# Patient Record
Sex: FEMALE | Race: WHITE | Employment: OTHER | URBAN - METROPOLITAN AREA
[De-identification: names, ages, dates, MRNs, and addresses within clinical notes are randomized per-mention and may not be internally consistent; named-entity substitution may affect disease eponyms.]

---

## 2017-09-08 ENCOUNTER — ANESTHESIA EVENT (OUTPATIENT)
Dept: SURGERY | Facility: CLINIC | Age: 37
End: 2017-09-08
Payer: MEDICARE

## 2017-09-10 ASSESSMENT — ENCOUNTER SYMPTOMS: SEIZURES: 1

## 2017-09-10 NOTE — ANESTHESIA PREPROCEDURE EVALUATION
HPI:  Jenn Briggs is a 37 year old female with a primary diagnosis of dental caries who presents for dental repair.    Otherwise, she  has a past medical history of Constipation; Dysphagia; Hyperlipemia; Mental retardation; Obsessive compulsive disorder; Restless leg syndrome; and Seizure (H).  she  has a past surgical history that includes Exam under anesthesia pelvic (3/28/2013); Exam under anesthesia, restorations, extraction(s) dental complex, combined (3/28/2013); and Exam under anesthesia, restorations, extraction(s) dental, combined (N/A, 10/29/2015).    Anesthesia Evaluation     . Pt has had prior anesthetic.     No history of anesthetic complications          ROS/MED HX    ENT/Pulmonary:  - neg pulmonary ROS     Neurologic:     (+)seizures (well controlled) other neuro Severe mental retardation    Cardiovascular:     (+) Dyslipidemia, ----. : . . . :. .       METS/Exercise Tolerance:     Hematologic:  - neg hematologic  ROS       Musculoskeletal:  - neg musculoskeletal ROS       GI/Hepatic:  - neg GI/hepatic ROS       Renal/Genitourinary:  - ROS Renal section negative       Endo:  - neg endo ROS       Psychiatric:     (+) psychiatric history Psychiatric Hx List: Obessive complusive disorder.      Infectious Disease:  - neg infectious disease ROS       Malignancy:         Other:                         Physical Exam  Normal systems: pulmonary and dental    Airway   Mallampati: I  TM distance: >3 FB  Neck ROM: full    Dental     Cardiovascular   Rhythm and rate: regular and normal      Pulmonary                     Lab Results   Component Value Date    WBC 9.3 03/28/2013    HGB 12.7 03/28/2013    HCT 38.2 03/28/2013     03/28/2013    POTASSIUM 4.0 03/28/2013    PTT 30 03/28/2013    INR 1.03 03/28/2013    HCGS Negative 09/11/2017         Preop Vitals  BP Readings from Last 3 Encounters:   09/11/17 110/83   10/29/15 (!) 140/103   03/28/13 111/86    Pulse Readings from Last 3 Encounters:   09/11/17 86  "     Resp Readings from Last 3 Encounters:   09/11/17 16   10/29/15 22   03/28/13 15    SpO2 Readings from Last 3 Encounters:   09/11/17 97%   10/29/15 93%   03/28/13 93%      Temp Readings from Last 1 Encounters:   09/11/17 36.6  C (97.9  F) (Axillary)    Ht Readings from Last 1 Encounters:   09/11/17 1.651 m (5' 5\")      Wt Readings from Last 1 Encounters:   09/11/17 61.1 kg (134 lb 11.2 oz)    Estimated body mass index is 22.42 kg/(m^2) as calculated from the following:    Height as of this encounter: 1.651 m (5' 5\").    Weight as of this encounter: 61.1 kg (134 lb 11.2 oz).     Current Medications  Outpatient Prescriptions Marked as Taking for the 9/11/17 encounter (Hospital Encounter)   Medication Sig     LevETIRAcetam (KEPPRA PO) Take 500 mg by mouth 2 times daily     bisacodyl (DULCOLAX) 10 MG suppository Place 10 mg rectally daily as needed.     calcium carbonate (OS- MG [place]. [place]) 500 MG tablet Take 500 mg by mouth 2 times daily.     cholecalciferol (VITAMIN D) 400 UNIT TABS Take 400 Units by mouth daily.     escitalopram (LEXAPRO) 20 MG tablet Take 20 mg by mouth daily.     ibuprofen (ADVIL,MOTRIN) 200 MG tablet Take 200 mg by mouth every 6 hours as needed.     lactulose (CHRONULAC) 10 GM/15ML solution Take 20 g by mouth. 15cc daily     loratadine (CLARITIN) 10 MG tablet Take 10 mg by mouth daily.     norgestrel-ethinyl estradiol (LO/OVRAL) 0.3-30 MG-MCG per tablet Take 1 tablet by mouth.     polyethylene glycol (MIRALAX/GLYCOLAX) powder Take 17 g by mouth 2 times daily.     Saline (SODIUM CHLORIDE NA) Spray  in nostril as needed.         LDA         Anesthesia Plan      History & Physical Review  History and physical reviewed and following examination; no interval change.    ASA Status:  2 .    NPO Status:  > 6 hours    Plan for General and ETT with Inhalation induction. Maintenance will be Balanced.    PONV prophylaxis:  Ondansetron (or other 5HT-3) and Dexamethasone or Solumedrol  Consented " Person: Father  Consented via: Phone Consent    Discussed common and potentially harmful risks for General Anesthesia.   These risks include, but were not limited to: Conversion to secured airway, Sore throat, Airway injury, Dental injury, Aspiration, Respiratory issues (Bronchospasm, Laryngospasm, Desaturation), Hemodynamic issues (Arrhythmia, Hypotension, Ischemia), Potential long term consequences of respiratory and hemodynamic issues, PONV, Emergence delirium, Potential overnight admission  Risks of invasive procedures were not discussed: N/A    All questions were answered.      Postoperative Care  Postoperative pain management:  Multi-modal analgesia.      Consents  Anesthetic plan, risks, benefits and alternatives discussed with:  Parent (Mother and/or Father).  Use of blood products discussed: No .   .        Daniel Joseph, 9/11/2017, 11:09 AM

## 2017-09-11 ENCOUNTER — HOSPITAL ENCOUNTER (OUTPATIENT)
Facility: CLINIC | Age: 37
Discharge: HOME | End: 2017-09-11
Attending: DENTIST | Admitting: DENTIST
Payer: MEDICARE

## 2017-09-11 ENCOUNTER — ANESTHESIA (OUTPATIENT)
Dept: SURGERY | Facility: CLINIC | Age: 37
End: 2017-09-11
Payer: MEDICARE

## 2017-09-11 ENCOUNTER — SURGERY (OUTPATIENT)
Age: 37
End: 2017-09-11

## 2017-09-11 VITALS
HEART RATE: 86 BPM | SYSTOLIC BLOOD PRESSURE: 148 MMHG | DIASTOLIC BLOOD PRESSURE: 91 MMHG | OXYGEN SATURATION: 94 % | RESPIRATION RATE: 16 BRPM | WEIGHT: 134.7 LBS | HEIGHT: 65 IN | BODY MASS INDEX: 22.44 KG/M2 | TEMPERATURE: 98.1 F

## 2017-09-11 LAB — HCG SERPL QL: NEGATIVE

## 2017-09-11 PROCEDURE — 25000132 ZZH RX MED GY IP 250 OP 250 PS 637: Mod: GY | Performed by: DENTIST

## 2017-09-11 PROCEDURE — A9270 NON-COVERED ITEM OR SERVICE: HCPCS | Mod: GY | Performed by: DENTIST

## 2017-09-11 PROCEDURE — 84703 CHORIONIC GONADOTROPIN ASSAY: CPT | Performed by: ANESTHESIOLOGY

## 2017-09-11 PROCEDURE — 25000125 ZZHC RX 250: Performed by: STUDENT IN AN ORGANIZED HEALTH CARE EDUCATION/TRAINING PROGRAM

## 2017-09-11 PROCEDURE — 71000014 ZZH RECOVERY PHASE 1 LEVEL 2 FIRST HR: Performed by: DENTIST

## 2017-09-11 PROCEDURE — 37000008 ZZH ANESTHESIA TECHNICAL FEE, 1ST 30 MIN: Performed by: DENTIST

## 2017-09-11 PROCEDURE — 71000027 ZZH RECOVERY PHASE 2 EACH 15 MINS: Performed by: DENTIST

## 2017-09-11 PROCEDURE — 40000170 ZZH STATISTIC PRE-PROCEDURE ASSESSMENT II: Performed by: DENTIST

## 2017-09-11 PROCEDURE — 25000125 ZZHC RX 250: Performed by: DENTIST

## 2017-09-11 PROCEDURE — 36000053 ZZH SURGERY LEVEL 2 EA 15 ADDTL MIN - UMMC: Performed by: DENTIST

## 2017-09-11 PROCEDURE — 37000009 ZZH ANESTHESIA TECHNICAL FEE, EACH ADDTL 15 MIN: Performed by: DENTIST

## 2017-09-11 PROCEDURE — 27210794 ZZH OR GENERAL SUPPLY STERILE: Performed by: DENTIST

## 2017-09-11 PROCEDURE — 25000566 ZZH SEVOFLURANE, EA 15 MIN: Performed by: DENTIST

## 2017-09-11 PROCEDURE — 36000051 ZZH SURGERY LEVEL 2 1ST 30 MIN - UMMC: Performed by: DENTIST

## 2017-09-11 PROCEDURE — 25000125 ZZHC RX 250: Performed by: ANESTHESIOLOGY

## 2017-09-11 PROCEDURE — 25000128 H RX IP 250 OP 636: Performed by: STUDENT IN AN ORGANIZED HEALTH CARE EDUCATION/TRAINING PROGRAM

## 2017-09-11 PROCEDURE — 25000132 ZZH RX MED GY IP 250 OP 250 PS 637: Mod: GY | Performed by: ANESTHESIOLOGY

## 2017-09-11 PROCEDURE — 71000015 ZZH RECOVERY PHASE 1 LEVEL 2 EA ADDTL HR: Performed by: DENTIST

## 2017-09-11 PROCEDURE — A9270 NON-COVERED ITEM OR SERVICE: HCPCS | Mod: GY | Performed by: ANESTHESIOLOGY

## 2017-09-11 PROCEDURE — S0077 INJECTION, CLINDAMYCIN PHOSP: HCPCS | Performed by: DENTIST

## 2017-09-11 PROCEDURE — 36415 COLL VENOUS BLD VENIPUNCTURE: CPT | Performed by: ANESTHESIOLOGY

## 2017-09-11 RX ORDER — SODIUM CHLORIDE, SODIUM LACTATE, POTASSIUM CHLORIDE, CALCIUM CHLORIDE 600; 310; 30; 20 MG/100ML; MG/100ML; MG/100ML; MG/100ML
INJECTION, SOLUTION INTRAVENOUS CONTINUOUS PRN
Status: DISCONTINUED | OUTPATIENT
Start: 2017-09-11 | End: 2017-09-11

## 2017-09-11 RX ORDER — HYDRALAZINE HYDROCHLORIDE 20 MG/ML
2.5-5 INJECTION INTRAMUSCULAR; INTRAVENOUS EVERY 10 MIN PRN
Status: DISCONTINUED | OUTPATIENT
Start: 2017-09-11 | End: 2017-09-11 | Stop reason: HOSPADM

## 2017-09-11 RX ORDER — MEPERIDINE HYDROCHLORIDE 25 MG/ML
12.5 INJECTION INTRAMUSCULAR; INTRAVENOUS; SUBCUTANEOUS
Status: DISCONTINUED | OUTPATIENT
Start: 2017-09-11 | End: 2017-09-11 | Stop reason: HOSPADM

## 2017-09-11 RX ORDER — ONDANSETRON 4 MG/1
4 TABLET, ORALLY DISINTEGRATING ORAL EVERY 30 MIN PRN
Status: DISCONTINUED | OUTPATIENT
Start: 2017-09-11 | End: 2017-09-11 | Stop reason: HOSPADM

## 2017-09-11 RX ORDER — FENTANYL CITRATE 50 UG/ML
INJECTION, SOLUTION INTRAMUSCULAR; INTRAVENOUS PRN
Status: DISCONTINUED | OUTPATIENT
Start: 2017-09-11 | End: 2017-09-11

## 2017-09-11 RX ORDER — ONDANSETRON 2 MG/ML
0.07 INJECTION INTRAMUSCULAR; INTRAVENOUS EVERY 30 MIN PRN
Status: DISCONTINUED | OUTPATIENT
Start: 2017-09-11 | End: 2017-09-11 | Stop reason: HOSPADM

## 2017-09-11 RX ORDER — FENTANYL CITRATE 50 UG/ML
0.5 INJECTION, SOLUTION INTRAMUSCULAR; INTRAVENOUS EVERY 10 MIN PRN
Status: DISCONTINUED | OUTPATIENT
Start: 2017-09-11 | End: 2017-09-11 | Stop reason: HOSPADM

## 2017-09-11 RX ORDER — BACITRACIN ZINC 500 [USP'U]/G
OINTMENT TOPICAL PRN
Status: DISCONTINUED | OUTPATIENT
Start: 2017-09-11 | End: 2017-09-11 | Stop reason: HOSPADM

## 2017-09-11 RX ORDER — EPHEDRINE SULFATE 50 MG/ML
INJECTION, SOLUTION INTRAMUSCULAR; INTRAVENOUS; SUBCUTANEOUS PRN
Status: DISCONTINUED | OUTPATIENT
Start: 2017-09-11 | End: 2017-09-11

## 2017-09-11 RX ORDER — CLINDAMYCIN PHOSPHATE 900 MG/50ML
900 INJECTION, SOLUTION INTRAVENOUS SEE ADMIN INSTRUCTIONS
Status: DISCONTINUED | OUTPATIENT
Start: 2017-09-11 | End: 2017-09-11 | Stop reason: HOSPADM

## 2017-09-11 RX ORDER — LABETALOL HYDROCHLORIDE 5 MG/ML
5-10 INJECTION, SOLUTION INTRAVENOUS
Status: DISCONTINUED | OUTPATIENT
Start: 2017-09-11 | End: 2017-09-11 | Stop reason: HOSPADM

## 2017-09-11 RX ORDER — LIDOCAINE HYDROCHLORIDE 20 MG/ML
INJECTION, SOLUTION INFILTRATION; PERINEURAL PRN
Status: DISCONTINUED | OUTPATIENT
Start: 2017-09-11 | End: 2017-09-11

## 2017-09-11 RX ORDER — NALOXONE HYDROCHLORIDE 0.4 MG/ML
.1-.4 INJECTION, SOLUTION INTRAMUSCULAR; INTRAVENOUS; SUBCUTANEOUS
Status: DISCONTINUED | OUTPATIENT
Start: 2017-09-11 | End: 2017-09-11 | Stop reason: HOSPADM

## 2017-09-11 RX ORDER — CHLORHEXIDINE GLUCONATE ORAL RINSE 1.2 MG/ML
SOLUTION DENTAL PRN
Status: DISCONTINUED | OUTPATIENT
Start: 2017-09-11 | End: 2017-09-11 | Stop reason: HOSPADM

## 2017-09-11 RX ORDER — PROPOFOL 10 MG/ML
INJECTION, EMULSION INTRAVENOUS PRN
Status: DISCONTINUED | OUTPATIENT
Start: 2017-09-11 | End: 2017-09-11

## 2017-09-11 RX ORDER — ONDANSETRON 2 MG/ML
4 INJECTION INTRAMUSCULAR; INTRAVENOUS EVERY 30 MIN PRN
Status: DISCONTINUED | OUTPATIENT
Start: 2017-09-11 | End: 2017-09-11 | Stop reason: HOSPADM

## 2017-09-11 RX ORDER — FENTANYL CITRATE 50 UG/ML
25-50 INJECTION, SOLUTION INTRAMUSCULAR; INTRAVENOUS
Status: DISCONTINUED | OUTPATIENT
Start: 2017-09-11 | End: 2017-09-11 | Stop reason: HOSPADM

## 2017-09-11 RX ORDER — SODIUM CHLORIDE, SODIUM LACTATE, POTASSIUM CHLORIDE, CALCIUM CHLORIDE 600; 310; 30; 20 MG/100ML; MG/100ML; MG/100ML; MG/100ML
INJECTION, SOLUTION INTRAVENOUS CONTINUOUS
Status: DISCONTINUED | OUTPATIENT
Start: 2017-09-11 | End: 2017-09-11 | Stop reason: HOSPADM

## 2017-09-11 RX ORDER — ONDANSETRON 2 MG/ML
INJECTION INTRAMUSCULAR; INTRAVENOUS PRN
Status: DISCONTINUED | OUTPATIENT
Start: 2017-09-11 | End: 2017-09-11

## 2017-09-11 RX ORDER — MIDAZOLAM HYDROCHLORIDE 2 MG/ML
20 SYRUP ORAL
Status: COMPLETED | OUTPATIENT
Start: 2017-09-11 | End: 2017-09-11

## 2017-09-11 RX ORDER — ACETAMINOPHEN 500 MG
1000 TABLET ORAL ONCE
Status: COMPLETED | OUTPATIENT
Start: 2017-09-11 | End: 2017-09-11

## 2017-09-11 RX ORDER — DEXAMETHASONE SODIUM PHOSPHATE 4 MG/ML
INJECTION, SOLUTION INTRA-ARTICULAR; INTRALESIONAL; INTRAMUSCULAR; INTRAVENOUS; SOFT TISSUE PRN
Status: DISCONTINUED | OUTPATIENT
Start: 2017-09-11 | End: 2017-09-11

## 2017-09-11 RX ORDER — CLINDAMYCIN PHOSPHATE 900 MG/50ML
900 INJECTION, SOLUTION INTRAVENOUS
Status: COMPLETED | OUTPATIENT
Start: 2017-09-11 | End: 2017-09-11

## 2017-09-11 RX ADMIN — ACETAMINOPHEN 1000 MG: 500 TABLET ORAL at 10:28

## 2017-09-11 RX ADMIN — FENTANYL CITRATE 50 MCG: 50 INJECTION, SOLUTION INTRAMUSCULAR; INTRAVENOUS at 11:43

## 2017-09-11 RX ADMIN — ONDANSETRON 4 MG: 2 INJECTION INTRAMUSCULAR; INTRAVENOUS at 13:28

## 2017-09-11 RX ADMIN — OSELTAMIVIR PHOSPHATE 0.9 G: 75 CAPSULE ORAL at 13:44

## 2017-09-11 RX ADMIN — DEXAMETHASONE SODIUM PHOSPHATE 4 MG: 4 INJECTION, SOLUTION INTRAMUSCULAR; INTRAVENOUS at 12:12

## 2017-09-11 RX ADMIN — PROPOFOL 130 MG: 10 INJECTION, EMULSION INTRAVENOUS at 11:45

## 2017-09-11 RX ADMIN — LIDOCAINE HYDROCHLORIDE 100 MG: 20 INJECTION, SOLUTION INFILTRATION; PERINEURAL at 11:44

## 2017-09-11 RX ADMIN — Medication 10 MG: at 11:53

## 2017-09-11 RX ADMIN — CHLORHEXIDINE GLUCONATE 15 ML: 1.2 RINSE ORAL at 12:46

## 2017-09-11 RX ADMIN — MIDAZOLAM HYDROCHLORIDE 20 MG: 2 SYRUP ORAL at 11:00

## 2017-09-11 RX ADMIN — SODIUM CHLORIDE, POTASSIUM CHLORIDE, SODIUM LACTATE AND CALCIUM CHLORIDE: 600; 310; 30; 20 INJECTION, SOLUTION INTRAVENOUS at 12:05

## 2017-09-11 RX ADMIN — CLINDAMYCIN PHOSPHATE 900 MG: 18 INJECTION, SOLUTION INTRAVENOUS at 12:07

## 2017-09-11 NOTE — DISCHARGE INSTRUCTIONS
[facility]  Same-Day Surgery   Adult Discharge Orders & Instructions     For 24 hours after surgery    1. Get plenty of rest.  A responsible adult must stay with you for at least 24 hours after you leave the hospital.   2. Do not drive or use heavy equipment.  If you have weakness or tingling, don't drive or use heavy equipment until this feeling goes away.  3. Do not drink alcohol.  4. Avoid strenuous or risky activities.  Ask for help when climbing stairs.   5. You may feel lightheaded.  IF so, sit for a few minutes before standing.  Have someone help you get up.   6. If you have nausea (feel sick to your stomach): Drink only clear liquids such as apple juice, ginger ale, broth or 7-Up.  Rest may also help.  Be sure to drink enough fluids.  Move to a regular diet as you feel able.  7. You may have a slight fever. Call the doctor if your fever is over 100 F (37.7 C) (taken under the tongue) or lasts longer than 24 hours.  8. You may have a dry mouth, a sore throat, muscle aches or trouble sleeping.  These should go away after 24 hours.  9. Do not make important or legal decisions.   Call your doctor for any of the followin.  Signs of infection (fever, growing tenderness at the surgery site, a large amount of drainage or bleeding, severe pain, foul-smelling drainage, redness, swelling).    2. It has been over 8 to 10 hours since surgery and you are still not able to urinate (pass water).    3.  Headache for over 24 hours.    4.  Numbness, tingling or weakness the day after surgery (if you had spinal anesthesia).  To contact a doctor, call ________________________________________ or:        [phone] and ask for the resident on call for   ____dental resident____________________ (answered 24 hours a day)      Emergency Department:    [facility]: [phone]       (TTY for hearing impaired: [phone])    [facility]: [phone]       (TTY for hearing  impaired: [phone])

## 2017-09-11 NOTE — ANESTHESIA POSTPROCEDURE EVALUATION
Patient: Jenn Briggs    Procedure(s):  Dental Exam Radiographs, Periodontal Therapy, Floride Varnish, deep cleaning - Wound Class: II-Clean Contaminated    Diagnosis:Dental Caries, Periodontal Disease   Diagnosis Additional Information: No value filed.    Anesthesia Type:  General, ETT    Note:  Anesthesia Post Evaluation    Patient location during evaluation: PACU  Patient participation: Unable to participate in evaluation secondary to underlying medical condition  Level of consciousness: awake and alert  Pain management: adequate  Airway patency: patent  Cardiovascular status: acceptable and hemodynamically stable  Respiratory status: acceptable  Hydration status: acceptable  PONV: none     Anesthetic complications: None          Last vitals:  Vitals:    09/11/17 1445 09/11/17 1500 09/11/17 1515   BP: (!) 152/95 147/85 (!) 148/91   Pulse:      Resp: 18 14 12   Temp:      SpO2: 96% 93% 94%         Electronically Signed By: Facundo West MD  September 11, 2017  3:44 PM

## 2017-09-11 NOTE — IP AVS SNAPSHOT
[address] [address]    Phone:  [phone]                                       After Visit Summary   9/11/2017    Jenn Briggs    MRN: [id number]           After Visit Summary Signature Page     I have received my discharge instructions, and my questions have been answered. I have discussed any challenges I see with this plan with the nurse or doctor.    ..........................................................................................................................................  Patient/Patient Representative Signature      ..........................................................................................................................................  Patient Representative Print Name and Relationship to Patient    ..................................................               ................................................  Date                                            Time    ..........................................................................................................................................  Reviewed by Signature/Title    ...................................................              ..............................................  Date                                                            Time

## 2017-09-11 NOTE — OR NURSING
Pt is nonverbal. Group  Home worker Rowena will be int he waiting room during procedure and can be reached there.  Rowena reports pt is slow to wake from anesthesia. Ruby is not violent though doesn't tolerate tape, IV's,  hospital gowns, wrist bands on while not sedated.

## 2017-09-11 NOTE — IP AVS SNAPSHOT
MRN:[id number]                      After Visit Summary   9/11/2017    Jenn Briggs    MRN: [id number]           Thank you!     Thank you for choosing [facility] for your care. Our goal is always to provide you with excellent care. Hearing back from our patients is one way we can continue to improve our services. Please take a few minutes to complete the written survey that you may receive in the mail after you visit with us. Thank you!        Patient Information     Date Of Birth          1980        About your hospital stay     You were [facility] on:  September 11, 2017 [facility]    You were discharged on:  September 11, 2017       Who to Call     For medical emergencies, please call 911.  For non-urgent questions about your medical care, please call your primary care provider or clinic, [phone]  For questions related to your surgery, please call your surgery clinic        Attending Provider     Provider Nate Parrish DDS Dentist       Primary Care Provider Office Phone # Fax #    Nayely Andre [phone]      After Care Instructions     Discharge Instructions       Patient can return to normal activities immediately.  Return to clinic in 6 mos.                  Further instructions from your care team           [facility]  Same-Day Surgery   Adult Discharge Orders & Instructions     For 24 hours after surgery    1. Get plenty of rest.  A responsible adult must stay with you for at least 24 hours after you leave the hospital.   2. Do not drive or use heavy equipment.  If you have weakness or tingling, don't drive or use heavy equipment until this feeling goes away.  3. Do not drink alcohol.  4. Avoid strenuous or risky activities.  Ask for help when climbing stairs.   5. You may feel lightheaded.  IF so, sit for a few minutes before standing.  Have someone help you get up.   6. If  "you have nausea (feel sick to your stomach): Drink only clear liquids such as apple juice, ginger ale, broth or 7-Up.  Rest may also help.  Be sure to drink enough fluids.  Move to a regular diet as you feel able.  7. You may have a slight fever. Call the doctor if your fever is over 100 F (37.7 C) (taken under the tongue) or lasts longer than 24 hours.  8. You may have a dry mouth, a sore throat, muscle aches or trouble sleeping.  These should go away after 24 hours.  9. Do not make important or legal decisions.   Call your doctor for any of the followin.  Signs of infection (fever, growing tenderness at the surgery site, a large amount of drainage or bleeding, severe pain, foul-smelling drainage, redness, swelling).    2. It has been over 8 to 10 hours since surgery and you are still not able to urinate (pass water).    3.  Headache for over 24 hours.    4.  Numbness, tingling or weakness the day after surgery (if you had spinal anesthesia).  To contact a doctor, call ________________________________________ or:        [phone] and ask for the resident on call for   ____dental resident____________________ (answered 24 hours a day)      Emergency Department:    [facility]: [phone]       (TTY for hearing impaired: [phone])    [facility]: [phone]       (TTY for hearing impaired: [phone])          Pending Results     No orders found from 2017 to 2017.            Admission Information     Date & Time Provider Department Dept. Phone    2017 Nate Rajan DDS [facility] PACU [phone]      Your Vitals Were     Blood Pressure Pulse Temperature Respirations Height Weight    152/95 86 98.1  F (36.7  C) 18 1.651 m (5' 5\") 61.1 kg (134 lb 11.2 oz)    Last Period Pulse Oximetry BMI (Body Mass Index)             2017 96% 22.42 kg/m2         [employer] Information     [employer] lets you send messages to your doctor, view your test results, renew your prescriptions, " "schedule appointments and more. To sign up, go to [email or web address] . Click on \"Log in\" on the left side of the screen, which will take you to the Welcome page. Then click on \"Sign up Now\" on the right side of the page.     You will be asked to enter the access code listed below, as well as some personal information. Please follow the directions to create your username and password.     Your access code is: [email or web address]  Expires: 12/10/2017  1:48 PM     Your access code will  in 90 days. If you need help or a new code, please call your [facility] clinic or [phone].        Care EveryWhere ID     This is your Care EveryWhere ID. This could be used by other organizations to access your [facility] medical records  [id number]        Equal Access to Services     NAY RINALDI : Priscilla Avila, ciera baumann, omar umana, hansa cervantes . So [facility] [phone].    ATENCIÓN: Si habla español, tiene a ramírez disposición servicios gratuitos de asistencia lingüística. [place] al [phone].    We comply with applicable federal civil rights laws and [place] laws. We do not discriminate on the basis of race, color, national origin, age, disability sex, sexual orientation or gender identity.               Review of your medicines      UNREVIEWED medicines. Ask your doctor about these medicines        Dose / Directions    bisacodyl 10 MG Suppository   Commonly known as:  DULCOLAX        Dose:  10 mg   Place 10 mg rectally daily as needed.   Refills:  0       calcium carbonate 1250 MG tablet   Commonly known as:  OS- mg [place]. [place]        Dose:  500 mg   Take 500 mg by mouth 2 times daily.   Refills:  0       carbamide peroxide 6.5 % otic solution   Commonly known as:  DEBROX        Dose:  5 drop   5 drops daily as needed.   Refills:  0       cholecalciferol 400 UNIT Tabs tablet   Commonly known as:  vitamin D        Dose:  400 Units   Take 400 Units by mouth " daily.   Refills:  0       escitalopram 20 MG tablet   Commonly known as:  LEXAPRO        Dose:  20 mg   Take 20 mg by mouth daily.   Refills:  0       ibuprofen 200 MG tablet   Commonly known as:  ADVIL/MOTRIN        Dose:  200 mg   Take 200 mg by mouth every 6 hours as needed.   Refills:  0       KEPPRA PO   Indication:  seizures        Dose:  500 mg   Take 500 mg by mouth 2 times daily   Refills:  0       lactulose 10 GM/15ML solution   Commonly known as:  CHRONULAC        Dose:  20 g   Take 20 g by mouth. 15cc daily   Refills:  0       loratadine 10 MG tablet   Commonly known as:  CLARITIN        Dose:  10 mg   Take 10 mg by mouth daily.   Refills:  0       norgestrel-ethinyl estradiol 0.3-30 MG-MCG per tablet   Commonly known as:  LO/OVRAL        Dose:  1 tablet   Take 1 tablet by mouth.   Refills:  0       polyethylene glycol powder   Commonly known as:  MIRALAX/GLYCOLAX        Dose:  17 g   Take 17 g by mouth 2 times daily.   Refills:  0       SODIUM CHLORIDE NA        Spray  in nostril as needed.   Refills:  0                Protect others around you: Learn how to safely use, store and throw away your medicines at www.disposemymeds.org.             Medication List: This is a list of all your medications and when to take them. Check marks below indicate your daily home schedule. Keep this list as a reference.      Medications           Morning Afternoon Evening Bedtime As Needed    bisacodyl 10 MG Suppository   Commonly known as:  DULCOLAX   Place 10 mg rectally daily as needed.                                calcium carbonate 1250 MG tablet   Commonly known as:  OS- mg [place]. Ca   Take 500 mg by mouth 2 times daily.                                carbamide peroxide 6.5 % otic solution   Commonly known as:  DEBROX   5 drops daily as needed.                                cholecalciferol 400 UNIT Tabs tablet   Commonly known as:  vitamin D   Take 400 Units by mouth daily.                                 escitalopram 20 MG tablet   Commonly known as:  LEXAPRO   Take 20 mg by mouth daily.                                ibuprofen 200 MG tablet   Commonly known as:  ADVIL/MOTRIN   Take 200 mg by mouth every 6 hours as needed.                                KEPPRA PO   Take 500 mg by mouth 2 times daily                                lactulose 10 GM/15ML solution   Commonly known as:  CHRONULAC   Take 20 g by mouth. 15cc daily                                loratadine 10 MG tablet   Commonly known as:  CLARITIN   Take 10 mg by mouth daily.                                norgestrel-ethinyl estradiol 0.3-30 MG-MCG per tablet   Commonly known as:  LO/OVRAL   Take 1 tablet by mouth.                                polyethylene glycol powder   Commonly known as:  MIRALAX/GLYCOLAX   Take 17 g by mouth 2 times daily.                                SODIUM CHLORIDE NA   Spray  in nostril as needed.

## 2017-09-11 NOTE — ANESTHESIA CARE TRANSFER NOTE
Patient: Jenn Briggs    Procedure(s):  Dental Exam Radiographs, Periodontal Therapy, Floride Varnish, deep cleaning - Wound Class: II-Clean Contaminated    Diagnosis: Dental Caries, Periodontal Disease   Diagnosis Additional Information: No value filed.    Anesthesia Type:   General, ETT     Note:  Airway :Nasopharyngeal Airway and Face Mask  Patient transferred to:PACU  Comments: Patient resting comfortably, breathing spontaneously.  Duarte Longo        Vitals: (Last set prior to Anesthesia Care Transfer)    CRNA VITALS  9/11/2017 1326 - 9/11/2017 1409      9/11/2017             Pulse: 113    Ht Rate: 113    SpO2: 96 %                Electronically Signed By: Duarte Longo MD  September 11, 2017  2:09 PM

## 2017-09-11 NOTE — BRIEF OP NOTE
[facility] [place]    Brief Operative Note    Pre-operative diagnosis: Dental Caries, Periodontal Disease   Post-operative diagnosis Moderate-Severe Periodontal Disease  Procedure: Procedure(s):  Dental Exam Radiographs, Periodontal Therapy, Floride Varnish, deep cleaning - Wound Class: II-Clean Contaminated  Surgeon: Surgeon(s) and Role:     * Ivan Adair DDS - Primary     * Chuck Nicholas MD - Resident - Assisting  Anesthesia: General   Estimated blood loss: Minimal  Drains: None  Specimens: * No specimens in log *  Findings:   None.  Complications: None.  Implants: None.

## 2017-09-12 NOTE — OP NOTE
DATE OF PROCEDURE:  09/11/2017.      It was deemed necessary for this patient to be seen in the hospital operating room because of mental retardation, seizures, and inability to be treated in a traditional dental clinic setting.      PROCEDURES PERFORMED:   Under general anesthesia, the following operations were performed in the mouth:   1.  Bilateral dental examination.   2.  Dental radiographs.   3.  Prophylaxis.   4.  Periodontal therapy.   5.  Fluoride varnish application.      ATTENDING PHYSICIAN:  Ivan Adair DDS, MPH     FIRST ASSISTANT DENTAL RESIDENT:  Chuck Nicholas DDS      ANESTHESIOLOGIST:  Daniel Joseph MD      SCRUB NURSES:  Jackson BADILLO NURSE:  Leslie.      ANESTHESIOLOGY RESIDENT:  Duarte Longo MD      PREOPERATIVE DIAGNOSES:  Suspected periodontal disease and dental caries.      POSTOPERATIVE DIAGNOSES:  Generalized mild chronic periodontitis with localized severe chronic periodontitis.      DESCRIPTION OF PROCEDURE:  Jenn Briggs was brought into the operating room and draped in the usual customary [facility] [place] fashion.  Following the timeout procedures, general anesthesia was administered through the patient's right naris.  A bilateral dental exam was performed and 3 periapical and 4 bite wing radiographs were obtained and interpreted.  A moist throat pack was placed at 12:43 p.m.  Clinical examination revealed generalized heavy plaque and heavy supragingival and subgingival calculus, generalized bleeding on probing, and periodontal pockets ranging from 3-5 mm.  Radiographic examination revealed normal bone trabeculation and generalized horizontal bone loss with localized severe bone loss around tooth #24 and #25.  No local anesthetic was used.  The following procedures were performed:  Periodontal therapy was performed on all teeth using ultrasonic debridement, supragingival and subgingival scaling and root planing with rubber cup polishing  and flossing.  Fluoride varnish was applied to all teeth.  The throat pack was removed with suction at 1:35 p.m.  The oropharynx was inspected and found to be clear.  Estimated blood loss was 2 mL.      The attending doctor, Dr. Maynard, was present for the entire procedure.  The patient was extubated in the operating room and taken to the post-anesthesia care unit in good condition.         JOANNA MAYNARD DDS, MPH     As dictated by CHANTALE LIM DDS           D: 2017 16:37   T: 2017 03:32   MT: mann      Name:     PAPI MAJOR   MRN:      [id number]        Account:        [id number]   :      1980           Procedure Date: 2017      Document: [id number]

## 2019-02-23 ENCOUNTER — AMBULATORY - HEALTHEAST (OUTPATIENT)
Dept: OTHER | Facility: CLINIC | Age: 39
End: 2019-02-23

## 2019-02-23 ENCOUNTER — DOCUMENTATION ONLY (OUTPATIENT)
Dept: OTHER | Facility: CLINIC | Age: 39
End: 2019-02-23

## 2019-03-28 ENCOUNTER — HOSPITAL ENCOUNTER (OUTPATIENT)
Facility: CLINIC | Age: 39
Discharge: NURSING, REHAB OR LONG-TERM CARE | End: 2019-03-28
Attending: DENTIST | Admitting: DENTIST
Payer: MEDICARE

## 2019-03-28 ENCOUNTER — ANESTHESIA (OUTPATIENT)
Dept: SURGERY | Facility: CLINIC | Age: 39
End: 2019-03-28
Payer: MEDICARE

## 2019-03-28 ENCOUNTER — ANESTHESIA EVENT (OUTPATIENT)
Dept: SURGERY | Facility: CLINIC | Age: 39
End: 2019-03-28
Payer: MEDICARE

## 2019-03-28 VITALS
RESPIRATION RATE: 12 BRPM | OXYGEN SATURATION: 96 % | WEIGHT: 135.36 LBS | SYSTOLIC BLOOD PRESSURE: 135 MMHG | BODY MASS INDEX: 25.56 KG/M2 | HEIGHT: 61 IN | HEART RATE: 96 BPM | TEMPERATURE: 96.3 F | DIASTOLIC BLOOD PRESSURE: 75 MMHG

## 2019-03-28 PROCEDURE — A9270 NON-COVERED ITEM OR SERVICE: HCPCS | Mod: GY | Performed by: DENTIST

## 2019-03-28 PROCEDURE — 25000125 ZZHC RX 250: Performed by: DENTIST

## 2019-03-28 PROCEDURE — 36000053 ZZH SURGERY LEVEL 2 EA 15 ADDTL MIN - UMMC: Performed by: DENTIST

## 2019-03-28 PROCEDURE — A9270 NON-COVERED ITEM OR SERVICE: HCPCS | Performed by: NURSE ANESTHETIST, CERTIFIED REGISTERED

## 2019-03-28 PROCEDURE — 25000132 ZZH RX MED GY IP 250 OP 250 PS 637: Mod: GY | Performed by: ANESTHESIOLOGY

## 2019-03-28 PROCEDURE — 37000009 ZZH ANESTHESIA TECHNICAL FEE, EACH ADDTL 15 MIN: Performed by: DENTIST

## 2019-03-28 PROCEDURE — 25000125 ZZHC RX 250: Performed by: NURSE ANESTHETIST, CERTIFIED REGISTERED

## 2019-03-28 PROCEDURE — 37000008 ZZH ANESTHESIA TECHNICAL FEE, 1ST 30 MIN: Performed by: DENTIST

## 2019-03-28 PROCEDURE — 71000014 ZZH RECOVERY PHASE 1 LEVEL 2 FIRST HR: Performed by: DENTIST

## 2019-03-28 PROCEDURE — 25000128 H RX IP 250 OP 636: Performed by: NURSE ANESTHETIST, CERTIFIED REGISTERED

## 2019-03-28 PROCEDURE — 40000170 ZZH STATISTIC PRE-PROCEDURE ASSESSMENT II: Performed by: DENTIST

## 2019-03-28 PROCEDURE — 36000051 ZZH SURGERY LEVEL 2 1ST 30 MIN - UMMC: Performed by: DENTIST

## 2019-03-28 PROCEDURE — 25000566 ZZH SEVOFLURANE, EA 15 MIN: Performed by: DENTIST

## 2019-03-28 PROCEDURE — 25000132 ZZH RX MED GY IP 250 OP 250 PS 637: Mod: GY | Performed by: DENTIST

## 2019-03-28 PROCEDURE — 71000027 ZZH RECOVERY PHASE 2 EACH 15 MINS: Performed by: DENTIST

## 2019-03-28 PROCEDURE — 25800030 ZZH RX IP 258 OP 636: Performed by: NURSE ANESTHETIST, CERTIFIED REGISTERED

## 2019-03-28 RX ORDER — ONDANSETRON 2 MG/ML
INJECTION INTRAMUSCULAR; INTRAVENOUS PRN
Status: DISCONTINUED | OUTPATIENT
Start: 2019-03-28 | End: 2019-03-28

## 2019-03-28 RX ORDER — MEPERIDINE HYDROCHLORIDE 25 MG/ML
12.5 INJECTION INTRAMUSCULAR; INTRAVENOUS; SUBCUTANEOUS
Status: DISCONTINUED | OUTPATIENT
Start: 2019-03-28 | End: 2019-03-28 | Stop reason: HOSPADM

## 2019-03-28 RX ORDER — ACETAMINOPHEN 325 MG/1
975 TABLET ORAL ONCE
Status: DISCONTINUED | OUTPATIENT
Start: 2019-03-28 | End: 2019-03-28 | Stop reason: HOSPADM

## 2019-03-28 RX ORDER — FENTANYL CITRATE 50 UG/ML
25 INJECTION, SOLUTION INTRAMUSCULAR; INTRAVENOUS
Status: DISCONTINUED | OUTPATIENT
Start: 2019-03-28 | End: 2019-03-28 | Stop reason: HOSPADM

## 2019-03-28 RX ORDER — PROPOFOL 10 MG/ML
INJECTION, EMULSION INTRAVENOUS PRN
Status: DISCONTINUED | OUTPATIENT
Start: 2019-03-28 | End: 2019-03-28

## 2019-03-28 RX ORDER — EPHEDRINE SULFATE 50 MG/ML
INJECTION, SOLUTION INTRAMUSCULAR; INTRAVENOUS; SUBCUTANEOUS PRN
Status: DISCONTINUED | OUTPATIENT
Start: 2019-03-28 | End: 2019-03-28

## 2019-03-28 RX ORDER — KETOROLAC TROMETHAMINE 30 MG/ML
INJECTION, SOLUTION INTRAMUSCULAR; INTRAVENOUS PRN
Status: DISCONTINUED | OUTPATIENT
Start: 2019-03-28 | End: 2019-03-28

## 2019-03-28 RX ORDER — CLINDAMYCIN PHOSPHATE 900 MG/50ML
900 INJECTION, SOLUTION INTRAVENOUS
Status: COMPLETED | OUTPATIENT
Start: 2019-03-28 | End: 2019-03-28

## 2019-03-28 RX ORDER — SODIUM CHLORIDE, SODIUM LACTATE, POTASSIUM CHLORIDE, CALCIUM CHLORIDE 600; 310; 30; 20 MG/100ML; MG/100ML; MG/100ML; MG/100ML
INJECTION, SOLUTION INTRAVENOUS CONTINUOUS
Status: DISCONTINUED | OUTPATIENT
Start: 2019-03-28 | End: 2019-03-28 | Stop reason: HOSPADM

## 2019-03-28 RX ORDER — CLINDAMYCIN PHOSPHATE 900 MG/50ML
900 INJECTION, SOLUTION INTRAVENOUS SEE ADMIN INSTRUCTIONS
Status: DISCONTINUED | OUTPATIENT
Start: 2019-03-28 | End: 2019-03-28 | Stop reason: HOSPADM

## 2019-03-28 RX ORDER — BACITRACIN ZINC 500 [USP'U]/G
OINTMENT TOPICAL PRN
Status: DISCONTINUED | OUTPATIENT
Start: 2019-03-28 | End: 2019-03-28 | Stop reason: HOSPADM

## 2019-03-28 RX ORDER — ONDANSETRON 4 MG/1
4 TABLET, ORALLY DISINTEGRATING ORAL EVERY 30 MIN PRN
Status: DISCONTINUED | OUTPATIENT
Start: 2019-03-28 | End: 2019-03-28 | Stop reason: HOSPADM

## 2019-03-28 RX ORDER — MIDAZOLAM HYDROCHLORIDE 2 MG/ML
20 SYRUP ORAL ONCE
Status: COMPLETED | OUTPATIENT
Start: 2019-03-28 | End: 2019-03-28

## 2019-03-28 RX ORDER — OXYMETAZOLINE HYDROCHLORIDE 0.05 G/100ML
SPRAY NASAL PRN
Status: DISCONTINUED | OUTPATIENT
Start: 2019-03-28 | End: 2019-03-28

## 2019-03-28 RX ORDER — SODIUM CHLORIDE, SODIUM LACTATE, POTASSIUM CHLORIDE, CALCIUM CHLORIDE 600; 310; 30; 20 MG/100ML; MG/100ML; MG/100ML; MG/100ML
INJECTION, SOLUTION INTRAVENOUS CONTINUOUS PRN
Status: DISCONTINUED | OUTPATIENT
Start: 2019-03-28 | End: 2019-03-28

## 2019-03-28 RX ORDER — CHLORHEXIDINE GLUCONATE ORAL RINSE 1.2 MG/ML
SOLUTION DENTAL PRN
Status: DISCONTINUED | OUTPATIENT
Start: 2019-03-28 | End: 2019-03-28 | Stop reason: HOSPADM

## 2019-03-28 RX ORDER — FENTANYL CITRATE 50 UG/ML
INJECTION, SOLUTION INTRAMUSCULAR; INTRAVENOUS PRN
Status: DISCONTINUED | OUTPATIENT
Start: 2019-03-28 | End: 2019-03-28

## 2019-03-28 RX ORDER — NALOXONE HYDROCHLORIDE 0.4 MG/ML
.1-.4 INJECTION, SOLUTION INTRAMUSCULAR; INTRAVENOUS; SUBCUTANEOUS
Status: DISCONTINUED | OUTPATIENT
Start: 2019-03-28 | End: 2019-03-28 | Stop reason: HOSPADM

## 2019-03-28 RX ORDER — ONDANSETRON 2 MG/ML
4 INJECTION INTRAMUSCULAR; INTRAVENOUS EVERY 30 MIN PRN
Status: DISCONTINUED | OUTPATIENT
Start: 2019-03-28 | End: 2019-03-28 | Stop reason: HOSPADM

## 2019-03-28 RX ORDER — DEXAMETHASONE SODIUM PHOSPHATE 4 MG/ML
INJECTION, SOLUTION INTRA-ARTICULAR; INTRALESIONAL; INTRAMUSCULAR; INTRAVENOUS; SOFT TISSUE PRN
Status: DISCONTINUED | OUTPATIENT
Start: 2019-03-28 | End: 2019-03-28

## 2019-03-28 RX ADMIN — FENTANYL CITRATE 50 MCG: 50 INJECTION, SOLUTION INTRAMUSCULAR; INTRAVENOUS at 11:15

## 2019-03-28 RX ADMIN — OXYMETAZOLINE HYDROCHLORIDE 3 SPRAY: 5 SPRAY NASAL at 10:28

## 2019-03-28 RX ADMIN — Medication 5 MG: at 10:28

## 2019-03-28 RX ADMIN — KETOROLAC TROMETHAMINE 30 MG: 30 INJECTION, SOLUTION INTRAMUSCULAR at 11:33

## 2019-03-28 RX ADMIN — SODIUM CHLORIDE, POTASSIUM CHLORIDE, SODIUM LACTATE AND CALCIUM CHLORIDE: 600; 310; 30; 20 INJECTION, SOLUTION INTRAVENOUS at 10:13

## 2019-03-28 RX ADMIN — SUGAMMADEX 120 MG: 100 INJECTION, SOLUTION INTRAVENOUS at 11:36

## 2019-03-28 RX ADMIN — CLINDAMYCIN PHOSPHATE 900 MG: 18 INJECTION, SOLUTION INTRAVENOUS at 10:43

## 2019-03-28 RX ADMIN — ROCURONIUM BROMIDE 50 MG: 10 INJECTION INTRAVENOUS at 10:25

## 2019-03-28 RX ADMIN — PROPOFOL 170 MG: 10 INJECTION, EMULSION INTRAVENOUS at 10:25

## 2019-03-28 RX ADMIN — ONDANSETRON 4 MG: 2 INJECTION INTRAMUSCULAR; INTRAVENOUS at 11:32

## 2019-03-28 RX ADMIN — MIDAZOLAM HYDROCHLORIDE 20 MG: 2 SYRUP ORAL at 09:47

## 2019-03-28 RX ADMIN — DEXAMETHASONE SODIUM PHOSPHATE 4 MG: 4 INJECTION, SOLUTION INTRAMUSCULAR; INTRAVENOUS at 10:53

## 2019-03-28 ASSESSMENT — MIFFLIN-ST. JEOR: SCORE: 1226.38

## 2019-03-28 NOTE — ANESTHESIA CARE TRANSFER NOTE
Patient: Jenn Briggs    Procedure(s):  Bilateral Dental Exam, Radiographs, Periodontal Therapy, Fluoride Varnish    Diagnosis: Dental Caries and Peridontal Disease  Diagnosis Additional Information: No value filed.    Anesthesia Type:   No value filed.     Note:  Airway :Face Mask  Patient transferred to:PACU  Comments: Transported to PACU with FM O2.  VSS.  Report given.  Patient comfortable.Handoff Report: Identifed the Patient, Identified the Reponsible Provider, Reviewed the pertinent medical history, Discussed the surgical course, Reviewed Intra-OP anesthesia mangement and issues during anesthesia, Set expectations for post-procedure period and Allowed opportunity for questions and acknowledgement of understanding      Vitals: (Last set prior to Anesthesia Care Transfer)    CRNA VITALS  3/28/2019 1127 - 3/28/2019 1203      3/28/2019             Pulse:  106    Ht Rate:  103    SpO2:  92 %                Electronically Signed By: GALO Garcia CRNA  March 28, 2019  12:03 PM

## 2019-03-28 NOTE — ANESTHESIA PREPROCEDURE EVALUATION
Anesthesia Pre-Procedure Evaluation    Patient: Jenn Briggs   MRN:     [id number] Gender:   female   Age:    38 year old :      1980        Preoperative Diagnosis: Dental Caries and Peridontal Disease   Procedure(s):  Bilateral Dental Exam, Radiographs, Dental Restoration(s), Periodontal Therapy, Dental Extraction(s), Biopsy     Past Medical History:   Diagnosis Date     Constipation      Dysphagia      Hyperlipemia      Mental retardation      Obsessive compulsive disorder      Restless leg syndrome      Seizure (H)       Past Surgical History:   Procedure Laterality Date     EXAM UNDER ANESTHESIA PELVIC  3/28/2013    Procedure: EXAM UNDER ANESTHESIA PELVIC;  Pelvic Exam, Dental Exam,  Periodontal Therapy, Radiograph, Fenectomy , gingivectomy, floride treatment in the mouth.;  Surgeon: Nayely Bird MD;  [facility]     EXAM UNDER ANESTHESIA, RESTORATIONS, EXTRACTION(S) DENTAL COMPLEX, COMBINED  3/28/2013    Procedure: COMBINED EXAM UNDER ANESTHESIA, RESTORATIONS, EXTRACTION(S) DENTAL COMPLEX;;  Surgeon: Roman Valera DDS;  [facility]     EXAM UNDER ANESTHESIA, RESTORATIONS, EXTRACTION(S) DENTAL, COMBINED N/A 10/29/2015    Procedure: COMBINED EXAM UNDER ANESTHESIA, RESTORATIONS, EXTRACTION(S) DENTAL;  Surgeon: Staci Aguillon DDS;  [facility]     EXAM UNDER ANESTHESIA, RESTORATIONS, EXTRACTION(S) DENTAL, COMBINED N/A 2017    Procedure: COMBINED EXAM UNDER ANESTHESIA, RESTORATIONS, EXTRACTION(S) DENTAL;  Dental Exam Radiographs, Periodontal Therapy, Floride Varnish, deep cleaning;  Surgeon: Ivan Adair DDS;  [facility]          Anesthesia Evaluation     .             ROS/MED HX    ENT/Pulmonary:  - neg pulmonary ROS     Neurologic:     (+)Developmental delay      Cardiovascular:     (+) Dyslipidemia, ----. : . . . :. .       METS/Exercise Tolerance:     Hematologic:  - neg hematologic  ROS       Musculoskeletal:  - neg musculoskeletal ROS      "  GI/Hepatic:  - neg GI/hepatic ROS       Renal/Genitourinary:  - ROS Renal section negative       Endo:  - neg endo ROS       Psychiatric:  - neg psychiatric ROS       Infectious Disease:  - neg infectious disease ROS       Malignancy:      - no malignancy   Other:    - neg other ROS                     PHYSICAL EXAM:   Mental Status/Neuro: A/A/O   Airway: Facies: Feasible  Mallampati: Not Assessed  Mouth/Opening: Not Assessed  TM distance: > 6 cm  Neck ROM: Full   Respiratory: Auscultation: CTAB     Resp. Rate: Normal     Resp. Effort: Normal      CV: Rhythm: Regular  Rate: Age appropriate  Heart: Normal Sounds   Comments:      Dental: Normal                  Lab Results   Component Value Date    WBC 9.3 03/28/2013    HGB 12.7 03/28/2013    HCT 38.2 03/28/2013     03/28/2013    POTASSIUM 4.0 03/28/2013    PTT 30 03/28/2013    INR 1.03 03/28/2013    HCGS Negative 09/11/2017       Preop Vitals  BP Readings from Last 3 Encounters:   03/28/19 118/83   09/11/17 (!) 148/91   10/29/15 (!) 140/103    Pulse Readings from Last 3 Encounters:   03/28/19 98   09/11/17 86      Resp Readings from Last 3 Encounters:   03/28/19 20   09/11/17 16   10/29/15 22    SpO2 Readings from Last 3 Encounters:   09/11/17 94%   10/29/15 93%   03/28/13 93%      Temp Readings from Last 1 Encounters:   09/11/17 36.7  C (98.1  F)    Ht Readings from Last 1 Encounters:   03/28/19 1.549 m (5' 1\")      Wt Readings from Last 1 Encounters:   03/28/19 61.4 kg (135 lb 5.8 oz)    Estimated body mass index is 25.58 kg/m  as calculated from the following:    Height as of this encounter: 1.549 m (5' 1\").    Weight as of this encounter: 61.4 kg (135 lb 5.8 oz).     LDA:            Assessment:   ASA SCORE: 2       Documentation: H&P complete; Preop Testing complete; Consents complete   Proceeding: Proceed without further delay  Tobacco Use:  NO Active use of Tobacco/UNKNOWN Tobacco use status     Plan:   Anes. Type:  General   Pre-Induction: Midazolam " IV; Acetaminophen PO   Induction:  IV (Standard)   Airway: Oral ETT   Access/Monitoring: PIV   Maintenance: Balanced   Emergence: Procedure Site   Logistics: Same Day Surgery     Postop Pain/Sedation Strategy:  Standard-Options: Opioids PRN     PONV Management:  Adult Risk Factors: Female, Non-Smoker, Postop Opioids                         Vinny Mauricio DO

## 2019-03-28 NOTE — BRIEF OP NOTE
[facility], [place]    Brief Operative Note    Pre-operative diagnosis: Dental Caries and Peridontal Disease  Post-operative diagnosis Periodontal disease  Procedure: Procedure(s):  Bilateral Dental Exam, Radiographs, Periodontal Therapy, Fluoride Varnish  Surgeon: Surgeon(s) and Role:     * Staci Aguillon DDS - Primary     * Bishnu Hollis DDS - Resident - Assisting  Anesthesia: General NETT   Estimated blood loss: 2 mL  Drains: None  Specimens: No specimens   Findings:   None.  Complications: None.  Implants: None.  The patient was extubated in the OR and taken to the PACU in good condition.

## 2019-03-28 NOTE — ANESTHESIA POSTPROCEDURE EVALUATION
Anesthesia POST Procedure Evaluation    Patient: Jenn Briggs   MRN:     [id number] Gender:   female   Age:    38 year old :      1980        Preoperative Diagnosis: Dental Caries and Peridontal Disease   Procedure(s):  Bilateral Dental Exam, Radiographs, Periodontal Therapy, Fluoride Varnish   Postop Comments: No value filed.       Anesthesia Type:  General    Reportable Event: NO     PAIN: Uncomplicated   Sign Out status: Comfortable, Well controlled pain     PONV: No PONV   Sign Out status:  No Nausea or Vomiting     Neuro/Psych: Uneventful perioperative course   Sign Out Status: Preoperative baseline     Airway/Resp.: Uneventful perioperative course   Sign Out Status: Non labored breathing, age appropriate RR; Resp. Status within EXPECTED Parameters     CV: Uneventful perioperative course   Sign Out status: Appropriate BP and perfusion indices; Appropriate HR/Rhythm     Disposition:   Sign Out in:  PACU  Disposition:  Phase II; Home  Recovery Course: Uneventful  Follow-Up: Not required     Comments/Narrative:  Uneventful anesthetic and recovery.            Last Anesthesia Record Vitals:  CRNA VITALS  3/28/2019 1127 - 3/28/2019 1227      3/28/2019             EKG:  Sinus rhythm          Last PACU/Preop Vitals:  Vitals:    19 1245 19 1300 19 1315   BP: (!) 133/93 (!) 127/94 135/75   Pulse: 87 96    Resp: 13 11 12   Temp:  35.7  C (96.3  F)    SpO2: 94% 96% 96%         Electronically Signed By: Vero Shahid MD, 2019, 2:33 PM

## 2019-03-29 NOTE — OP NOTE
Procedure Date: 03/28/2019      It was deemed necessary for this patient to be seen in the hospital operating room because of developmental delay and inability to be treated in a traditional dental clinic setting.      Under general anesthesia, the following operations were performed in the mouth:   1.  Bilateral dental examination.   2.  Dental radiographs.   3.  Prophylaxis.   4.  Fluoride varnish application.      ATTENDING PHYSICIAN:  Staci Aguillon DDS.       FIRST ASSISTANT DENTAL RESIDENT:  Bishnu Hollis DDS.       ANESTHESIOLOGIST:  Vinny Mauricio DO.        SCRUB NURSE:  Carmina.        CIRCULATING NURSE:  David.       CRNA:  Parveen.      PREOPERATIVE DIAGNOSIS:  Suspected periodontal disease and dental caries.      POSTOPERATIVE DIAGNOSIS:  Periodontal disease.      DESCRIPTION OF PROCEDURE:  The patient was brought into the operating room and draped in the usual customary [facility] fashion.  Following the timeout procedures, general anesthesia was administered through the patient's right naris.  A bilateral dental exam was performed and series of 3 periapical and 4 bitewing radiographs were obtained and interpreted.  A moist throat pack was placed at 11:13.  Clinical examination revealed generalized light plaque and heavy subgingival calculus, generalized bleeding on probing, periodontal pockets ranging from 2 mm to 7 mm,  generalized xerostomia and localized bone loss around the mandibular incisors.  Radiographic examination revealed normal bone trabeculation, generalized horizontal bone loss.  No local anesthesia was used.        The following procedures were performed:  Periodontal therapy was performed on all teeth using ultrasonic debridement, supragingival and subgingival scaling and root planing with rubber cup polishing and flossing.  Fluoride varnish was applied to all teeth.        The throat pack was removed with suction at 11:35.  The oropharynx was  inspected and found to be clear.  Estimated blood loss was 2 mL.  The attending doctor, Dr. Staci Beal, was present for the entire procedure.  The patient was extubated in the operating room and taken to the postanesthesia care unit in good condition.         STACI BEAL DDS       As dictated by LAUREL ALCALA DDS            D: 2019   T: 2019   MT:       Name:     PAPI MAJOR   MRN:      [id number]        Account:        [id number]   :      1980           Procedure Date: 2019      Document: [id number]

## 2021-05-03 ENCOUNTER — MEDICAL CORRESPONDENCE (OUTPATIENT)
Dept: HEALTH INFORMATION MANAGEMENT | Facility: CLINIC | Age: 41
End: 2021-05-03

## 2021-05-05 ENCOUNTER — TELEPHONE (OUTPATIENT)
Dept: OTOLARYNGOLOGY | Facility: CLINIC | Age: 41
End: 2021-05-05
Payer: MEDICARE

## 2021-05-05 ENCOUNTER — TRANSCRIBE ORDERS (OUTPATIENT)
Dept: OTHER | Age: 41
End: 2021-05-05

## 2021-05-05 DIAGNOSIS — R13.10 DYSPHAGIA, UNSPECIFIED TYPE: Primary | ICD-10-CM

## 2021-05-05 NOTE — TELEPHONE ENCOUNTER
[facility] Call Center    Phone Message    May a detailed message be left on voicemail: no     Reason for Call: Appointment Intake    Referring Provider Name: Dr. Nayely Andre at [facility] to ENT Surgeon  Diagnosis and/or Symptoms: Dysphagia, unspecified type    Action Taken: Message routed to:  [facility] ([facility] [[id number]]    Travel Screening: Not Applicable

## 2021-05-07 DIAGNOSIS — Z11.59 ENCOUNTER FOR SCREENING FOR OTHER VIRAL DISEASES: ICD-10-CM

## 2021-05-07 NOTE — TELEPHONE ENCOUNTER
FUTURE VISIT INFORMATION      FUTURE VISIT INFORMATION:    Date: 6/29/21    Time: 8 AM    Location[place] [facility]  REFERRAL INFORMATION:    Referring provider:  Dr. Nayely Andre    Referring providers clinic:   [facility]    Reason for visit/diagnosis: Dysphagia    RECORDS REQUESTED FROM:       Clinic name Comments Records Status Imaging Status   [facility] 5/3/21 - Referral from Dr. Andre  3/19/21 - [facility] OV with Dr. Andre  8/23/18 - [facility] OV with Dr. Villegas Care Everywhere    [facility] - Imaging 10/25/18 - XR Video Swallow  Care Everywhere 5/7 Req - In PACS                 * 5/7/21 7:53 AM Faxed req to [facility] for images to be pushed to [facility]. - Tiffanie

## 2021-05-20 ENCOUNTER — HOSPITAL ENCOUNTER (OUTPATIENT)
Facility: CLINIC | Age: 41
Discharge: NURSING, REHAB OR LONG-TERM CARE | End: 2021-05-20
Attending: DENTIST | Admitting: DENTIST
Payer: MEDICARE

## 2021-05-20 ENCOUNTER — ANESTHESIA (OUTPATIENT)
Dept: SURGERY | Facility: CLINIC | Age: 41
End: 2021-05-20
Payer: MEDICARE

## 2021-05-20 ENCOUNTER — ANESTHESIA EVENT (OUTPATIENT)
Dept: SURGERY | Facility: CLINIC | Age: 41
End: 2021-05-20
Payer: MEDICARE

## 2021-05-20 VITALS
TEMPERATURE: 97.7 F | RESPIRATION RATE: 16 BRPM | BODY MASS INDEX: 26.43 KG/M2 | HEART RATE: 111 BPM | HEIGHT: 61 IN | WEIGHT: 140 LBS | OXYGEN SATURATION: 93 % | DIASTOLIC BLOOD PRESSURE: 81 MMHG | SYSTOLIC BLOOD PRESSURE: 126 MMHG

## 2021-05-20 LAB — GLUCOSE BLDC GLUCOMTR-MCNC: 86 MG/DL (ref 70–99)

## 2021-05-20 PROCEDURE — 250N000013 HC RX MED GY IP 250 OP 250 PS 637: Performed by: ANESTHESIOLOGY

## 2021-05-20 PROCEDURE — 360N000075 HC SURGERY LEVEL 2, PER MIN: Performed by: DENTIST

## 2021-05-20 PROCEDURE — 82962 GLUCOSE BLOOD TEST: CPT

## 2021-05-20 PROCEDURE — 370N000017 HC ANESTHESIA TECHNICAL FEE, PER MIN: Performed by: DENTIST

## 2021-05-20 PROCEDURE — 250N000025 HC SEVOFLURANE, PER MIN: Performed by: DENTIST

## 2021-05-20 PROCEDURE — 710N000012 HC RECOVERY PHASE 2, PER MINUTE: Performed by: DENTIST

## 2021-05-20 PROCEDURE — 250N000011 HC RX IP 250 OP 636: Performed by: NURSE ANESTHETIST, CERTIFIED REGISTERED

## 2021-05-20 PROCEDURE — 710N000010 HC RECOVERY PHASE 1, LEVEL 2, PER MIN: Performed by: DENTIST

## 2021-05-20 PROCEDURE — 258N000003 HC RX IP 258 OP 636: Performed by: NURSE ANESTHETIST, CERTIFIED REGISTERED

## 2021-05-20 PROCEDURE — 999N000141 HC STATISTIC PRE-PROCEDURE NURSING ASSESSMENT: Performed by: DENTIST

## 2021-05-20 PROCEDURE — 250N000013 HC RX MED GY IP 250 OP 250 PS 637: Performed by: DENTIST

## 2021-05-20 PROCEDURE — 250N000009 HC RX 250: Performed by: NURSE ANESTHETIST, CERTIFIED REGISTERED

## 2021-05-20 RX ORDER — SODIUM CHLORIDE, SODIUM LACTATE, POTASSIUM CHLORIDE, CALCIUM CHLORIDE 600; 310; 30; 20 MG/100ML; MG/100ML; MG/100ML; MG/100ML
INJECTION, SOLUTION INTRAVENOUS CONTINUOUS PRN
Status: DISCONTINUED | OUTPATIENT
Start: 2021-05-20 | End: 2021-05-20

## 2021-05-20 RX ORDER — ONDANSETRON 2 MG/ML
INJECTION INTRAMUSCULAR; INTRAVENOUS PRN
Status: DISCONTINUED | OUTPATIENT
Start: 2021-05-20 | End: 2021-05-20

## 2021-05-20 RX ORDER — FENTANYL CITRATE 50 UG/ML
INJECTION, SOLUTION INTRAMUSCULAR; INTRAVENOUS PRN
Status: DISCONTINUED | OUTPATIENT
Start: 2021-05-20 | End: 2021-05-20

## 2021-05-20 RX ORDER — SODIUM CHLORIDE, SODIUM LACTATE, POTASSIUM CHLORIDE, CALCIUM CHLORIDE 600; 310; 30; 20 MG/100ML; MG/100ML; MG/100ML; MG/100ML
INJECTION, SOLUTION INTRAVENOUS CONTINUOUS
Status: DISCONTINUED | OUTPATIENT
Start: 2021-05-20 | End: 2021-05-20 | Stop reason: HOSPADM

## 2021-05-20 RX ORDER — LIDOCAINE 40 MG/G
CREAM TOPICAL
Status: DISCONTINUED | OUTPATIENT
Start: 2021-05-20 | End: 2021-05-20 | Stop reason: HOSPADM

## 2021-05-20 RX ORDER — MIDAZOLAM HYDROCHLORIDE 2 MG/ML
15 SYRUP ORAL ONCE
Status: COMPLETED | OUTPATIENT
Start: 2021-05-20 | End: 2021-05-20

## 2021-05-20 RX ORDER — PROPOFOL 10 MG/ML
INJECTION, EMULSION INTRAVENOUS PRN
Status: DISCONTINUED | OUTPATIENT
Start: 2021-05-20 | End: 2021-05-20

## 2021-05-20 RX ORDER — NALOXONE HYDROCHLORIDE 0.4 MG/ML
0.4 INJECTION, SOLUTION INTRAMUSCULAR; INTRAVENOUS; SUBCUTANEOUS
Status: DISCONTINUED | OUTPATIENT
Start: 2021-05-20 | End: 2021-05-20 | Stop reason: HOSPADM

## 2021-05-20 RX ORDER — DEXAMETHASONE SODIUM PHOSPHATE 4 MG/ML
INJECTION, SOLUTION INTRA-ARTICULAR; INTRALESIONAL; INTRAMUSCULAR; INTRAVENOUS; SOFT TISSUE PRN
Status: DISCONTINUED | OUTPATIENT
Start: 2021-05-20 | End: 2021-05-20

## 2021-05-20 RX ORDER — CHLORHEXIDINE GLUCONATE ORAL RINSE 1.2 MG/ML
SOLUTION DENTAL PRN
Status: DISCONTINUED | OUTPATIENT
Start: 2021-05-20 | End: 2021-05-20 | Stop reason: HOSPADM

## 2021-05-20 RX ORDER — NALOXONE HYDROCHLORIDE 0.4 MG/ML
0.2 INJECTION, SOLUTION INTRAMUSCULAR; INTRAVENOUS; SUBCUTANEOUS
Status: DISCONTINUED | OUTPATIENT
Start: 2021-05-20 | End: 2021-05-20 | Stop reason: HOSPADM

## 2021-05-20 RX ORDER — ONDANSETRON 2 MG/ML
4 INJECTION INTRAMUSCULAR; INTRAVENOUS EVERY 30 MIN PRN
Status: DISCONTINUED | OUTPATIENT
Start: 2021-05-20 | End: 2021-05-20 | Stop reason: HOSPADM

## 2021-05-20 RX ORDER — FENTANYL CITRATE 50 UG/ML
25-50 INJECTION, SOLUTION INTRAMUSCULAR; INTRAVENOUS
Status: DISCONTINUED | OUTPATIENT
Start: 2021-05-20 | End: 2021-05-20 | Stop reason: HOSPADM

## 2021-05-20 RX ORDER — EPHEDRINE SULFATE 50 MG/ML
INJECTION, SOLUTION INTRAMUSCULAR; INTRAVENOUS; SUBCUTANEOUS PRN
Status: DISCONTINUED | OUTPATIENT
Start: 2021-05-20 | End: 2021-05-20

## 2021-05-20 RX ORDER — ONDANSETRON 4 MG/1
4 TABLET, ORALLY DISINTEGRATING ORAL EVERY 30 MIN PRN
Status: DISCONTINUED | OUTPATIENT
Start: 2021-05-20 | End: 2021-05-20 | Stop reason: HOSPADM

## 2021-05-20 RX ORDER — HYDROMORPHONE HYDROCHLORIDE 1 MG/ML
0.2 INJECTION, SOLUTION INTRAMUSCULAR; INTRAVENOUS; SUBCUTANEOUS EVERY 5 MIN PRN
Status: DISCONTINUED | OUTPATIENT
Start: 2021-05-20 | End: 2021-05-20 | Stop reason: HOSPADM

## 2021-05-20 RX ORDER — LISINOPRIL 10 MG/1
10 TABLET ORAL DAILY
COMMUNITY

## 2021-05-20 RX ADMIN — SODIUM CHLORIDE, POTASSIUM CHLORIDE, SODIUM LACTATE AND CALCIUM CHLORIDE: 600; 310; 30; 20 INJECTION, SOLUTION INTRAVENOUS at 07:23

## 2021-05-20 RX ADMIN — SUGAMMADEX 130 MG: 100 INJECTION, SOLUTION INTRAVENOUS at 08:57

## 2021-05-20 RX ADMIN — PROPOFOL 80 MG: 10 INJECTION, EMULSION INTRAVENOUS at 07:28

## 2021-05-20 RX ADMIN — ONDANSETRON 4 MG: 2 INJECTION INTRAMUSCULAR; INTRAVENOUS at 08:57

## 2021-05-20 RX ADMIN — Medication 5 MG: at 07:55

## 2021-05-20 RX ADMIN — FENTANYL CITRATE 50 MCG: 50 INJECTION, SOLUTION INTRAMUSCULAR; INTRAVENOUS at 07:28

## 2021-05-20 RX ADMIN — ROCURONIUM BROMIDE 50 MG: 10 INJECTION INTRAVENOUS at 07:28

## 2021-05-20 RX ADMIN — FENTANYL CITRATE 25 MCG: 50 INJECTION, SOLUTION INTRAMUSCULAR; INTRAVENOUS at 08:29

## 2021-05-20 RX ADMIN — MIDAZOLAM HYDROCHLORIDE 15 MG: 2 SYRUP ORAL at 06:49

## 2021-05-20 RX ADMIN — DEXAMETHASONE SODIUM PHOSPHATE 6 MG: 4 INJECTION, SOLUTION INTRAMUSCULAR; INTRAVENOUS at 07:58

## 2021-05-20 RX ADMIN — Medication 5 MG: at 08:45

## 2021-05-20 ASSESSMENT — MIFFLIN-ST. JEOR: SCORE: 1237.42

## 2021-05-20 NOTE — ANESTHESIA PREPROCEDURE EVALUATION
Anesthesia Pre-Procedure Evaluation    Patient: Jenn Briggs   MRN: [id number] : 1980        Preoperative Diagnosis: Dental caries [K02.9]   Procedure : Procedure(s):  Biopsies, Frenectomy, Gingivectomy, Alveoloplasty, Periodontal Therapy, Fluoride, Varnish in Mouth, Endodontic Therapy, Bilateral Dental Exam, Radiographs, Dental Restorations, Dental Extractions,  Endo Therapy     Past Medical History:   Diagnosis Date     Constipation      Dysphagia      Hyperlipemia      Mental retardation      Obsessive compulsive disorder      Restless leg syndrome      Seizure (H)       Past Surgical History:   Procedure Laterality Date     EXAM UNDER ANESTHESIA PELVIC  3/28/2013    Procedure: EXAM UNDER ANESTHESIA PELVIC;  Pelvic Exam, Dental Exam,  Periodontal Therapy, Radiograph, Fenectomy , gingivectomy, floride treatment in the mouth.;  Surgeon: Nayely Bird MD;  [facility]     EXAM UNDER ANESTHESIA, RESTORATIONS, EXTRACTION(S) DENTAL COMPLEX, COMBINED  3/28/2013    Procedure: COMBINED EXAM UNDER ANESTHESIA, RESTORATIONS, EXTRACTION(S) DENTAL COMPLEX;;  Surgeon: Roman Valera DDS;  [facility]     EXAM UNDER ANESTHESIA, RESTORATIONS, EXTRACTION(S) DENTAL, COMBINED N/A 10/29/2015    Procedure: COMBINED EXAM UNDER ANESTHESIA, RESTORATIONS, EXTRACTION(S) DENTAL;  Surgeon: Staci Aguillon DDS;  [facility]     EXAM UNDER ANESTHESIA, RESTORATIONS, EXTRACTION(S) DENTAL, COMBINED N/A 2017    Procedure: COMBINED EXAM UNDER ANESTHESIA, RESTORATIONS, EXTRACTION(S) DENTAL;  Dental Exam Radiographs, Periodontal Therapy, Floride Varnish, deep cleaning;  Surgeon: Ivan Adair DDS;  [facility]     EXAM UNDER ANESTHESIA, RESTORATIONS, EXTRACTION(S) DENTAL, COMBINED Bilateral 3/28/2019    Procedure: Bilateral Dental Exam, Radiographs, Periodontal Therapy, Fluoride Varnish;  Surgeon: Staci Aguillon DDS;  [facility]      Allergies   Allergen Reactions      Penicillins      unknown      Social History     Tobacco Use     Smoking status: Never Smoker     Smokeless tobacco: Never Used   Substance Use Topics     Alcohol use: No      Wt Readings from Last 1 Encounters:   05/20/21 63.5 kg (140 lb)           Physical Exam    Airway   unable to assess          Respiratory Devices and Support         Dental    unable to assess        Cardiovascular          Rhythm and rate: normal     Pulmonary   pulmonary exam normal                OUTSIDE LABS:  CBC:   Lab Results   Component Value Date    WBC 9.3 03/28/2013    HGB 12.7 03/28/2013    HCT 38.2 03/28/2013     03/28/2013     BMP:   Lab Results   Component Value Date    POTASSIUM 4.0 03/28/2013     COAGS:   Lab Results   Component Value Date    PTT 30 03/28/2013    INR 1.03 03/28/2013     POC:   Lab Results   Component Value Date    HCGS Negative 09/11/2017     HEPATIC: No results found for: ALBUMIN, PROTTOTAL, ALT, AST, GGT, ALKPHOS, BILITOTAL, BILIDIRECT, TERESA  OTHER: No results found for: PH, LACT, A1C, ONI, PHOS, MAG, LIPASE, AMYLASE, TSH, T4, T3, CRP, SED    Anesthesia Plan    ASA Status:  3      Anesthesia Type: General.     - Airway: ETT   Induction: Intravenous.   Maintenance: Balanced.        Consents         - Extended Intubation/Ventilatory Support Discussed: No.    Use of blood products discussed: No .     Postoperative Care            Comments:                Raza Bobby DO

## 2021-05-20 NOTE — DISCHARGE INSTRUCTIONS
Same-Day Surgery   Adult Discharge Orders & Instructions     For 24 hours after surgery:  1. Get plenty of rest.  A responsible adult must stay with you for at least 24 hours after you leave the hospital.   2. Pain medication can slow your reflexes. Do not drive or use heavy equipment.  If you have weakness or tingling, don't drive or use heavy equipment until this feeling goes away.  3. Mixing alcohol and pain medication can cause dizziness and slow your breathing. It can even be fatal. Do not drink alcohol while taking pain medication.  4. Avoid strenuous or risky activities.  Ask for help when climbing stairs.   5. You may feel lightheaded.  If so, sit for a few minutes before standing.  Have someone help you get up.   6. If you have nausea (feel sick to your stomach), drink only clear liquids such as apple juice, ginger ale, broth or 7-Up.  Rest may also help.  Be sure to drink enough fluids.  Move to a regular diet as you feel able. Take pain medications with a small amount of solid food, such as toast or crackers, to avoid nausea.   7. A slight fever is normal. Call the doctor if your fever is over 100 F (37.7 C) (taken under the tongue) or lasts longer than 24 hours.  8. You may have a dry mouth, muscle aches, trouble sleeping or a sore throat.  These symptoms should go away after 24 hours.  9. Do not make important or legal decisions.   Pain Management:      1. Take pain medication (if prescribed) for pain as directed by your physician.        2. WARNING: If the pain medication you have been prescribed contains Tylenol  (acetaminophen), DO NOT take additional doses of Tylenol (acetaminophen).     Call your doctor for any of the followin.  Signs of infection (fever, growing tenderness at the surgery site, severe pain, a large amount of drainage or bleeding, foul-smelling drainage, redness, swelling).    2.  It has been over 8 to 10 hours since surgery and you are still not able to urinate (pee).    3.   Headache for over 24 hours.    4.  Numbness, tingling or weakness the day after surgery (if you had spinal anesthesia).  To contact a doctor, call ___Dr. Satinder Irving (see number above)____ or:      [phone] and ask for the Resident On Call for:          __Dental____________________ (answered 24 hours a day)      Emergency Department:  [facility] [place] [facility]: [phone]  [facility]: [phone]               Rev. 10/2014

## 2021-05-20 NOTE — OR NURSING
Dr Bobby notified of patient unable to produce urine sample for HCG. Okay to procede today without sample.

## 2021-05-20 NOTE — ANESTHESIA CARE TRANSFER NOTE
Patient: Jenn Briggs    Procedure(s):  Periodontal Therapy, Fluoride, Varnish in Mouth, Bilateral Dental Exam, Radiographs    Diagnosis: Dental caries [K02.9]  Diagnosis Additional Information: No value filed.    Anesthesia Type:   General     Note:    Oropharynx: oral airway in place  Level of Consciousness: drowsy  Oxygen Supplementation: face mask  Level of Supplemental Oxygen (L/min / FiO2): 8  Independent Airway: airway patency satisfactory and stable  Dentition: dentition unchanged  Vital Signs Stable: post-procedure vital signs reviewed and stable  Report to RN Given: handoff report given  Patient transferred to: PACU    Handoff Report: Identifed the Patient, Identified the Reponsible Provider, Reviewed the pertinent medical history, Discussed the surgical course, Reviewed Intra-OP anesthesia mangement and issues during anesthesia, Set expectations for post-procedure period and Allowed opportunity for questions and acknowledgement of understanding      Vitals: (Last set prior to Anesthesia Care Transfer)  CRNA VITALS  5/20/2021 0835 - 5/20/2021 0915      5/20/2021             NIBP:  139/84    Ht Rate:  98    Temp:  36.4  C (97.5  F)    SpO2:  99 %    EKG:  NSR        Electronically Signed By: GALO Mccarthy CRNA  May 20, 2021  9:15 AM

## 2021-05-20 NOTE — BRIEF OP NOTE
[facility]    Brief Operative Note    Pre-operative diagnosis: Dental caries [K02.9]  Post-operative diagnosis chronic periodontal disease    Procedure: Procedure(s):  Periodontal Therapy, Fluoride, Varnish in Mouth, Bilateral Dental Exam, Radiographs  Surgeon: Surgeon(s) and Role:     * Nate Rajan, GIOVANNAS - Primary     * Marc Nash MD - Resident - Assisting     * Heide Hein MD - Resident - Assisting  Anesthesia: General   Estimated blood loss: 2 ml  Drains: None  Specimens: * No specimens in log *  Findings:   None.  Complications: None.  Implants: * No implants in log *

## 2021-05-28 NOTE — OP NOTE
Procedure Date: 05/20/2021    INDICATIONS:  It was deemed necessary for this patient to be seen in the hospital operating room because of medical history significant for unspecified developmental delay and inability to be treated in the traditional dental clinic setting.  Risks and complications were reviewed with the patient's guardian.  Complications including but not limited to postoperative pain, swelling, bleeding, infection, temporary or permanent paresthesia or anesthesia of cranial nerve V3, lingual nerve, failure to resolve chief complaint, or need for additional procedures were explained to the patient and guardian.  The patient's guardian agrees to procedure as written.  Consents are in the chart.    PROCEDURE:  Under general anesthesia, the following operations were performed in the mouth:  Bilateral dental exam, dental radiographs, periodontal therapy, fluoride varnish application.    ATTENDING:  Nate Rajan DDS    FIRST ASSISTANT DENTAL RESIDENTS:  1.  Marc Nash MD  2.  Isatu Hein MD    SCRUB NURSE:  Sallie BADILLO NURSE:  Delmis     CRNA:  Wallace    PREOPERATIVE DIAGNOSIS:  Suspected periodontal disease and dental caries.    POSTOPERATIVE DIAGNOSIS:  Periodontal disease only.    DESCRIPTION OF PROCEDURE:  The patient was brought into the operating room and draped in the usual customary [facility] fashion.  Following the timeout procedures, general anesthesia was administered.  Throat pack in time was 08:20.  Bilateral dental exam was performed, and full-mouth radiographs were obtained.  Clinical exam revealed heavy plaque and supra and subgingival calculus in all teeth surfaces, generalized bleeding on probing.  Radiographic examination revealed normal bone trabeculation, severe localized horizontal bone loss.  No local anesthetic was used for this procedure.  The following procedures were performed:  Periodontal therapy was performed on all teeth using  ultrasonic debridement with a Cavitron, supragingival and subgingival scaling and root planning with appropriate curettes and scalers, rubber cup polishing and flossing, after which fluoride varnish was applied to all the teeth.  The throat pack was removed with suction at 08:53.  Oropharynx was inspected and found to be clear.  Estimated blood loss was 2 mL.  The attending, Dr. Nate Rajan, was present for the entire procedure.  The patient was extubated in the operating room and taken to the Postanesthesia Care Unit in good condition.    Nate Rajan DDS    As Dictated by HAMZAH DELGADO MD        D: 2021   T: 2021   MT: BRISA    Name:     PAPI MAJOR  MRN:      [id number]        Account:        [id number]   :      1980           Procedure Date: 2021     Document: [id number]

## 2021-05-31 ENCOUNTER — RECORDS - HEALTHEAST (OUTPATIENT)
Dept: ADMINISTRATIVE | Facility: CLINIC | Age: 41
End: 2021-05-31

## 2021-06-29 ENCOUNTER — TELEPHONE (OUTPATIENT)
Dept: OTOLARYNGOLOGY | Facility: CLINIC | Age: 41
End: 2021-06-29

## 2021-06-29 ENCOUNTER — PRE VISIT (OUTPATIENT)
Dept: OTOLARYNGOLOGY | Facility: CLINIC | Age: 41
End: 2021-06-29

## 2021-06-29 ENCOUNTER — VIRTUAL VISIT (OUTPATIENT)
Dept: OTOLARYNGOLOGY | Facility: CLINIC | Age: 41
End: 2021-06-29
Payer: MEDICARE

## 2021-06-29 VITALS — HEIGHT: 65 IN | WEIGHT: 135 LBS | BODY MASS INDEX: 22.49 KG/M2

## 2021-06-29 DIAGNOSIS — R13.10 DYSPHAGIA, UNSPECIFIED TYPE: ICD-10-CM

## 2021-06-29 PROCEDURE — 99203 OFFICE O/P NEW LOW 30 MIN: CPT | Mod: 95 | Performed by: OTOLARYNGOLOGY

## 2021-06-29 ASSESSMENT — MIFFLIN-ST. JEOR: SCORE: 1278.24

## 2021-06-29 NOTE — NURSING NOTE
"Chief Complaint   Patient presents with     Consult     Video visit new       Height 1.651 m (5' 5\"), weight 61.2 kg (135 lb).    Gagan Virgen, EMT  "

## 2021-06-29 NOTE — TELEPHONE ENCOUNTER
Records Requested  06/29/21    [facility]  Phone: [phone]  Fax: [phone]   Outcome * 6/29/21 8:42 AM Called out to [facility] to see if they still have the images and if they can send us the imaging reports for video swallow and esophagram done in September 2008. Also faxed urgent req. - Tiffanie    * 6/29/21 11:12 AM Records received and sent to [facility] to be scanned into the chart. Image resolved in PACs. - Tiffanie    9/3/08 - XR Video Swallow with Speech  9/2/08 - XR Esophagram

## 2021-06-29 NOTE — PROGRESS NOTES
[facility]   at the [facility]   Otolaryngology Clinic     Patient: Jenn Briggs    MRN: [id number]    : 1980    Age/Gender: 40 year old female  Date of Service: 2021  Rendering Provider:   Abbey Cole MD     Referring Provider   PCP: Nayely Andre  Referring Physician: Nayely Andre MD  Reason for Consultation   Dysphagia  History   HISTORY OF PRESENT ILLNESS: I was asked to consult on Jenn Briggs, by Dr Nayely TINOCO for evaluation of dysphagia. Ms. Briggs is a 40 year old female who presents to us today with dysphagia for years    Of note, she is non verbal and lives in a group home    Today, she, presents for evaluation with the , Rowena. History is obtained from Rowena. She reports:  Non-verbal  She has some choking issues and swallowing issues  Her dad in the past has had his esophagus stretched  Rowena Johnson -   Her food is pureed  She doesn t chew well  She does a lot of coughing  She recently choked on a piece of chicken  Liquids are ok  When her food was cut into bite size pieces - used to choke more frequently  Now that they are grounding it - she is not choking as often  She s never had her esophagus stretched  It s been for years - for 10 years  No pneumonias  No weight loss  Everymeal she is doing some coughing throughout the eating process  Staff person sits with her to have breaks in between bites and drinks  When she does multiple bites - she chokes  Even with the staff assisting - she still has that coughing  Nothing recently  Ruby moved in     Dyspnea - denies   No voice - shell make sounds and  happy sound and laugh  Her dad - makes decisions  No neck surgeries  She has has electrodes done for her seizure activity    No GI  No meds for reflux      No stroke   Has had some falls - very unsteady - balance issue   Now she walks with her walker  Sees a doctor at [facility] for rehab and occupational  health  Has seizure    PAST MEDICAL HISTORY:   Past Medical History:   Diagnosis Date     Constipation      Dysphagia      Hyperlipemia      Mental retardation      Obsessive compulsive disorder      Restless leg syndrome      Seizure (H)        PAST SURGICAL HISTORY:   Past Surgical History:   Procedure Laterality Date     EXAM UNDER ANESTHESIA PELVIC  3/28/2013    Procedure: EXAM UNDER ANESTHESIA PELVIC;  Pelvic Exam, Dental Exam,  Periodontal Therapy, Radiograph, Fenectomy , gingivectomy, floride treatment in the mouth.;  Surgeon: Nayely Bird MD;  [facility]     EXAM UNDER ANESTHESIA, RESTORATIONS, EXTRACTION(S) DENTAL COMPLEX, COMBINED  3/28/2013    Procedure: COMBINED EXAM UNDER ANESTHESIA, RESTORATIONS, EXTRACTION(S) DENTAL COMPLEX;;  Surgeon: Roman Valera DDS;  [facility]     EXAM UNDER ANESTHESIA, RESTORATIONS, EXTRACTION(S) DENTAL COMPLEX, COMBINED N/A 5/20/2021    Procedure: Periodontal Therapy, Fluoride, Varnish in Mouth, Bilateral Dental Exam, Radiographs;  Surgeon: Nate Rajan DDS;  [facility]     EXAM UNDER ANESTHESIA, RESTORATIONS, EXTRACTION(S) DENTAL, COMBINED N/A 10/29/2015    Procedure: COMBINED EXAM UNDER ANESTHESIA, RESTORATIONS, EXTRACTION(S) DENTAL;  Surgeon: Staci Aguillon DDS;  [facility]     EXAM UNDER ANESTHESIA, RESTORATIONS, EXTRACTION(S) DENTAL, COMBINED N/A 9/11/2017    Procedure: COMBINED EXAM UNDER ANESTHESIA, RESTORATIONS, EXTRACTION(S) DENTAL;  Dental Exam Radiographs, Periodontal Therapy, Floride Varnish, deep cleaning;  Surgeon: Ivan Adair DDS;  [facility]     EXAM UNDER ANESTHESIA, RESTORATIONS, EXTRACTION(S) DENTAL, COMBINED Bilateral 3/28/2019    Procedure: Bilateral Dental Exam, Radiographs, Periodontal Therapy, Fluoride Varnish;  Surgeon: Staci Aguillon DDS;  [facility]       CURRENT MEDICATIONS:   Current Outpatient Medications:      bisacodyl (DULCOLAX) 10 MG suppository, Place 10 mg  rectally daily as needed., Disp: , Rfl:      calcium carbonate (OS- MG [place]. CA) 500 MG tablet, Take 500 mg by mouth 2 times daily., Disp: , Rfl:      carbamide peroxide (DEBROX) 6.5 % otic solution, 5 drops daily as needed., Disp: , Rfl:      cholecalciferol (VITAMIN D) 400 UNIT TABS, Take 400 Units by mouth daily., Disp: , Rfl:      escitalopram (LEXAPRO) 20 MG tablet, Take 20 mg by mouth daily., Disp: , Rfl:      ibuprofen (ADVIL,MOTRIN) 200 MG tablet, Take 200 mg by mouth every 6 hours as needed., Disp: , Rfl:      lactulose (CHRONULAC) 10 GM/15ML solution, Take 20 g by mouth. 15cc daily, Disp: , Rfl:      LevETIRAcetam (KEPPRA PO), Take 500 mg by mouth 2 times daily, Disp: , Rfl:      lisinopril (ZESTRIL) 10 MG tablet, Take 10 mg by mouth daily, Disp: , Rfl:      loratadine (CLARITIN) 10 MG tablet, Take 10 mg by mouth daily., Disp: , Rfl:      melatonin 5 MG tablet, Take 5 mg by mouth nightly as needed for sleep, Disp: , Rfl:      norgestrel-ethinyl estradiol (LO/OVRAL) 0.3-30 MG-MCG per tablet, Take 1 tablet by mouth., Disp: , Rfl:      polyethylene glycol (MIRALAX/GLYCOLAX) powder, Take 17 g by mouth 2 times daily., Disp: , Rfl:      Saline (SODIUM CHLORIDE NA), Spray  in nostril as needed., Disp: , Rfl:     ALLERGIES: Pcn [penicillins]    SOCIAL HISTORY:    Social History     Socioeconomic History     Marital status: Single     Spouse name: Not on file     Number of children: Not on file     Years of education: Not on file     Highest education level: Not on file   Occupational History     Not on file   Social Needs     Financial resource strain: Not on file     Food insecurity     Worry: Not on file     Inability: Not on file     Transportation needs     Medical: Not on file     Non-medical: Not on file   Tobacco Use     Smoking status: Never Smoker     Smokeless tobacco: Never Used   Substance and Sexual Activity     Alcohol use: No     Drug use: No     Sexual activity: Not on file   Lifestyle      Physical activity     Days per week: Not on file     Minutes per session: Not on file     Stress: Not on file   Relationships     Social connections     Talks on phone: Not on file     Gets together: Not on file     Attends [employer] service: Not on file     Active member of club or organization: Not on file     Attends meetings of clubs or organizations: Not on file     Relationship status: Not on file     Intimate partner violence     Fear of current or ex partner: Not on file     Emotionally abused: Not on file     Physically abused: Not on file     Forced sexual activity: Not on file   Other Topics Concern     Not on file   Social History Narrative     Not on file         FAMILY HISTORY: No family history on file.  Non-contributory for problems with anesthesia    REVIEW OF SYSTEMS:   The patient was asked a 14 point review of systems regarding constitutional symptoms, eye symptoms, ears, nose, mouth, throat symptoms, cardiovascular symptoms, respiratory symptoms, gastrointestinal symptoms, genitourinary symptoms, musculoskeletal symptoms, integumentary symptoms, neurological symptoms, psychiatric symptoms, endocrine symptoms, hematologic/lymphatic symptoms, and allergic/ immunologic symptoms.   The pertinent factors have been included in the HPI and below.  Patient Supplied Answers to Review of Systems  No flowsheet data found.    Physical Examination   The patient underwent a physical examination as described below. The pertinent positive and negative findings are summarized after the description of the examination.    Constitutional: The patient's developmental and nutritional status was assessed. The patient's voice quality was assessed.  Head and Face: The head and face were inspected for deformities. Facial muscle strength was assessed bilaterally.  Eyes: Extraocular movements and primary gaze alignment were assessed.  Ears, Nose, Mouth and Throat: The ears and nose were examined for deformities.The lips  were examined for abnormalities.   Neck: The neck was visualized for abnormal neck masses  Respiratory: The nature of the breathing was observed.  Extremities: The hand extremities were examined for any clubbing or cyanosis.  Skin: The skin was examined for inflammatory or neoplastic conditions.  Neurologic: The patient's orientation, mood, and affect were noted. The cranial nerve  functions were examined.  Other pertinent positive and negative findings on physical examination:   Breathing comfortably on room air  No coughing or throat clearing    All other physical examination findings were within normal limits and noncontributory     Review of Relevant Clinical Data   Notes: Nayely Andre 21    Labs:  No results found for: TSH  No results found for: NA, CO2, BUN, CREAT, GLUCOSE, PHOS  Lab Results   Component Value Date    WBC 9.3 2013    HGB 12.7 2013    HCT 38.2 2013    MCV 93 2013     2013     Lab Results   Component Value Date    PTT 30 2013    INR 1.03 2013     No results found for: MABEL  No components found for: RHEUMATOIDFACTOR,  RF  No results found for: CRP  No components found for: CKTOT, URICACID  No components found for: C3, C4, DSDNAAB, NDNAABIFA  No results found for: MPOAB    Patient reported Quality of Life (QOL) Measures   Patient Supplied Answers To VHI Questionnaire  No flowsheet data found.      Patient Supplied Answers To EAT Questionnaire  No flowsheet data found.      Patient Supplied Answers To CSI Questionnaire  No flowsheet data found.      @[email or web address]@    Impression & Plan     IMPRESSION: Ms. Briggs is a 40 year old female who is being seen for the followin. Dysphagia  - had Xray Video Swallow Exam and esophagram in  - too old to see results  - long standing  - pureed food for 10 years  - has coughing and choking with food  - eats with an assistant  - no PNAs  - no changes in weight  Plan  - try to obtain 2008 swallow  records  - Xray Video Swallow Exam with esophagram  - follow up virtual after her xrays with her dad       RETURN VISIT: after testing, virtual    Thank you for the kind referral and for allowing me to share in the care of Ms. Briggs. If you have any questions, please do not hesitate to contact me.    Abbey Cole MD    Laryngology    [facility]  Department of  Otolaryngology - Head and Neck Surgery  Cl[facility]ics & [facility] Center  [address] [place] [address]  Appointment line: [phone]  Fax: [phone]

## 2021-06-29 NOTE — LETTER
2021       RE: Jenn Briggs  [address]  [place] [address]     Dear Colleague,    Thank you for referring your patient, Jenn Briggs, to the [facility] EAR NOSE AND THROAT [facility]INIC [place] [facility]. Please see a copy of my visit note below.        [facility]   at the [facility]   Otolaryngology Clinic     Patient: Jenn Briggs    MRN: [id number]    : 1980    Age/Gender: 40 year old female  Date of Service: 2021  Rendering Provider:   Abbey Cole MD     Referring Provider   PCP: Nayely Andre  Referring Physician: Nayely Andre MD  Reason for Consultation   Dysphagia  History   HISTORY OF PRESENT ILLNESS: I was asked to consult on Jenn Briggs, by Dr Nayely TINOCO for evaluation of dysphagia. Ms. Briggs is a 40 year old female who presents to us today with dysphagia for years    Of note, she is non verbal and lives in a group home    Today, she, presents for evaluation with the , Rowena. History is obtained from Rowena. She reports:  Non-verbal  She has some choking issues and swallowing issues  Her dad in the past has had his esophagus stretched  Rowena Johnson -   Her food is pureed  She doesn t chew well  She does a lot of coughing  She recently choked on a piece of chicken  Liquids are ok  When her food was cut into bite size pieces - used to choke more frequently  Now that they are grounding it - she is not choking as often  She s never had her esophagus stretched  It s been for years - for 10 years  No pneumonias  No weight loss  Everymeal she is doing some coughing throughout the eating process  Staff person sits with her to have breaks in between bites and drinks  When she does multiple bites - she chokes  Even with the staff assisting - she still has that coughing  Nothing recently  Ruby moved in     Dyspnea - denies   No  voice - shell make sounds and  happy sound and laugh  Her dad - makes decisions  No neck surgeries  She has has electrodes done for her seizure activity    No GI  No meds for reflux      No stroke   Has had some falls - very unsteady - balance issue   Now she walks with her walker  Sees a doctor at [facility] for rehab and occupational health  Has seizure    PAST MEDICAL HISTORY:   Past Medical History:   Diagnosis Date     Constipation      Dysphagia      Hyperlipemia      Mental retardation      Obsessive compulsive disorder      Restless leg syndrome      Seizure (H)        PAST SURGICAL HISTORY:   Past Surgical History:   Procedure Laterality Date     EXAM UNDER ANESTHESIA PELVIC  3/28/2013    Procedure: EXAM UNDER ANESTHESIA PELVIC;  Pelvic Exam, Dental Exam,  Periodontal Therapy, Radiograph, Fenectomy , gingivectomy, floride treatment in the mouth.;  Surgeon: Nayely Bird MD;  [facility]     EXAM UNDER ANESTHESIA, RESTORATIONS, EXTRACTION(S) DENTAL COMPLEX, COMBINED  3/28/2013    Procedure: COMBINED EXAM UNDER ANESTHESIA, RESTORATIONS, EXTRACTION(S) DENTAL COMPLEX;;  Surgeon: Roman Valera DDS;  [facility]     EXAM UNDER ANESTHESIA, RESTORATIONS, EXTRACTION(S) DENTAL COMPLEX, COMBINED N/A 5/20/2021    Procedure: Periodontal Therapy, Fluoride, Varnish in Mouth, Bilateral Dental Exam, Radiographs;  Surgeon: Nate Rajan DDS;  [facility]     EXAM UNDER ANESTHESIA, RESTORATIONS, EXTRACTION(S) DENTAL, COMBINED N/A 10/29/2015    Procedure: COMBINED EXAM UNDER ANESTHESIA, RESTORATIONS, EXTRACTION(S) DENTAL;  Surgeon: Staci Aguillon DDS;  [facility]     EXAM UNDER ANESTHESIA, RESTORATIONS, EXTRACTION(S) DENTAL, COMBINED N/A 9/11/2017    Procedure: COMBINED EXAM UNDER ANESTHESIA, RESTORATIONS, EXTRACTION(S) DENTAL;  Dental Exam Radiographs, Periodontal Therapy, Floride Varnish, deep cleaning;  Surgeon: Ivan Adair DDS;  [facility]     EXAM UNDER  ANESTHESIA, RESTORATIONS, EXTRACTION(S) DENTAL, COMBINED Bilateral 3/28/2019    Procedure: Bilateral Dental Exam, Radiographs, Periodontal Therapy, Fluoride Varnish;  Surgeon: Staci Aguillon DDS;  [facility]       CURRENT MEDICATIONS:   Current Outpatient Medications:      bisacodyl (DULCOLAX) 10 MG suppository, Place 10 mg rectally daily as needed., Disp: , Rfl:      calcium carbonate (OS- MG [place]. CA) 500 MG tablet, Take 500 mg by mouth 2 times daily., Disp: , Rfl:      carbamide peroxide (DEBROX) 6.5 % otic solution, 5 drops daily as needed., Disp: , Rfl:      cholecalciferol (VITAMIN D) 400 UNIT TABS, Take 400 Units by mouth daily., Disp: , Rfl:      escitalopram (LEXAPRO) 20 MG tablet, Take 20 mg by mouth daily., Disp: , Rfl:      ibuprofen (ADVIL,MOTRIN) 200 MG tablet, Take 200 mg by mouth every 6 hours as needed., Disp: , Rfl:      lactulose (CHRONULAC) 10 GM/15ML solution, Take 20 g by mouth. 15cc daily, Disp: , Rfl:      LevETIRAcetam (KEPPRA PO), Take 500 mg by mouth 2 times daily, Disp: , Rfl:      lisinopril (ZESTRIL) 10 MG tablet, Take 10 mg by mouth daily, Disp: , Rfl:      loratadine (CLARITIN) 10 MG tablet, Take 10 mg by mouth daily., Disp: , Rfl:      melatonin 5 MG tablet, Take 5 mg by mouth nightly as needed for sleep, Disp: , Rfl:      norgestrel-ethinyl estradiol (LO/OVRAL) 0.3-30 MG-MCG per tablet, Take 1 tablet by mouth., Disp: , Rfl:      polyethylene glycol (MIRALAX/GLYCOLAX) powder, Take 17 g by mouth 2 times daily., Disp: , Rfl:      Saline (SODIUM CHLORIDE NA), Spray  in nostril as needed., Disp: , Rfl:     ALLERGIES: Pcn [penicillins]    SOCIAL HISTORY:    Social History     Socioeconomic History     Marital status: Single     Spouse name: Not on file     Number of children: Not on file     Years of education: Not on file     Highest education level: Not on file   Occupational History     Not on file   Social Needs     Financial resource strain: Not on file     Food  insecurity     Worry: Not on file     Inability: Not on file     Transportation needs     Medical: Not on file     Non-medical: Not on file   Tobacco Use     Smoking status: Never Smoker     Smokeless tobacco: Never Used   Substance and Sexual Activity     Alcohol use: No     Drug use: No     Sexual activity: Not on file   Lifestyle     Physical activity     Days per week: Not on file     Minutes per session: Not on file     Stress: Not on file   Relationships     Social connections     Talks on phone: Not on file     Gets together: Not on file     Attends [employer] service: Not on file     Active member of club or organization: Not on file     Attends meetings of clubs or organizations: Not on file     Relationship status: Not on file     Intimate partner violence     Fear of current or ex partner: Not on file     Emotionally abused: Not on file     Physically abused: Not on file     Forced sexual activity: Not on file   Other Topics Concern     Not on file   Social History Narrative     Not on file         FAMILY HISTORY: No family history on file.  Non-contributory for problems with anesthesia    REVIEW OF SYSTEMS:   The patient was asked a 14 point review of systems regarding constitutional symptoms, eye symptoms, ears, nose, mouth, throat symptoms, cardiovascular symptoms, respiratory symptoms, gastrointestinal symptoms, genitourinary symptoms, musculoskeletal symptoms, integumentary symptoms, neurological symptoms, psychiatric symptoms, endocrine symptoms, hematologic/lymphatic symptoms, and allergic/ immunologic symptoms.   The pertinent factors have been included in the HPI and below.  Patient Supplied Answers to Review of Systems  No flowsheet data found.    Physical Examination   The patient underwent a physical examination as described below. The pertinent positive and negative findings are summarized after the description of the examination.    Constitutional: The patient's developmental and nutritional  status was assessed. The patient's voice quality was assessed.  Head and Face: The head and face were inspected for deformities. Facial muscle strength was assessed bilaterally.  Eyes: Extraocular movements and primary gaze alignment were assessed.  Ears, Nose, Mouth and Throat: The ears and nose were examined for deformities.The lips were examined for abnormalities.   Neck: The neck was visualized for abnormal neck masses  Respiratory: The nature of the breathing was observed.  Extremities: The hand extremities were examined for any clubbing or cyanosis.  Skin: The skin was examined for inflammatory or neoplastic conditions.  Neurologic: The patient's orientation, mood, and affect were noted. The cranial nerve  functions were examined.  Other pertinent positive and negative findings on physical examination:   Breathing comfortably on room air  No coughing or throat clearing    All other physical examination findings were within normal limits and noncontributory     Review of Relevant Clinical Data   Notes: Nayely Andre 5/17/21    Labs:  No results found for: TSH  No results found for: NA, CO2, BUN, CREAT, GLUCOSE, PHOS  Lab Results   Component Value Date    WBC 9.3 03/28/2013    HGB 12.7 03/28/2013    HCT 38.2 03/28/2013    MCV 93 03/28/2013     03/28/2013     Lab Results   Component Value Date    PTT 30 03/28/2013    INR 1.03 03/28/2013     No results found for: MABEL  No components found for: RHEUMATOIDFACTOR,  RF  No results found for: CRP  No components found for: CKTOT, URICACID  No components found for: C3, C4, DSDNAAB, NDNAABIFA  No results found for: MPOAB    Patient reported Quality of Life (QOL) Measures   Patient Supplied Answers To VHI Questionnaire  No flowsheet data found.      Patient Supplied Answers To EAT Questionnaire  No flowsheet data found.      Patient Supplied Answers To CSI Questionnaire  No flowsheet data found.      @[email or web address]@    Impression & Plan     IMPRESSION: Ms. Briggs is a 40  year old female who is being seen for the followin. Dysphagia  - had Xray Video Swallow Exam and esophagram in  - too old to see results  - long standing  - pureed food for 10 years  - has coughing and choking with food  - eats with an assistant  - no PNAs  - no changes in weight  Plan  - try to obtain  swallow records  - Xray Video Swallow Exam with esophagram  - follow up virtual after her xrays with her dad       RETURN VISIT: after testing, virtual    Thank you for the kind referral and for allowing me to share in the care of Ms. Briggs. If you have any questions, please do not hesitate to contact me.    Abbey Cole MD    Laryngology    [facility]  Department of  Otolaryngology - Head and Neck Surgery  [facility] [place] [address]  Appointment line: [phone]  Fax: [phone]         Again, thank you for allowing me to participate in the care of your patient.      Sincerely,    Abbey Cole MD

## 2021-06-30 ENCOUNTER — DOCUMENTATION ONLY (OUTPATIENT)
Dept: OTOLARYNGOLOGY | Facility: CLINIC | Age: 41
End: 2021-06-30

## 2021-06-30 NOTE — PROGRESS NOTES
Video Esophagram Review Rounds  Imaging Review of MBSS conducted with attending physician Abbey Cole and reviewed/discussed with SLP Lucia Palomino, Angelica Denson, Noemy Bailon and/or Liliam Chilel    Relevant Background:        MBSS Date: 7/2/21    Findings:  Pharyngeal Weakness:Yes  Epiglottic dysfunction: No  Penetration: Yes  Aspiration: Yes  UES dysfunction: No  Details: difficult to fully visualize the patient had moved around during the swallow has premature spillage and no UES dysfunction      Recommendations:  Diet: honey thick liquids, DD2 soft diet   More support and pacing

## 2021-07-02 ENCOUNTER — ANCILLARY PROCEDURE (OUTPATIENT)
Dept: GENERAL RADIOLOGY | Facility: CLINIC | Age: 41
End: 2021-07-02
Attending: OTOLARYNGOLOGY
Payer: MEDICARE

## 2021-07-02 ENCOUNTER — THERAPY VISIT (OUTPATIENT)
Dept: SPEECH THERAPY | Facility: CLINIC | Age: 41
End: 2021-07-02
Attending: OTOLARYNGOLOGY
Payer: MEDICARE

## 2021-07-02 DIAGNOSIS — R13.10 DYSPHAGIA, UNSPECIFIED TYPE: ICD-10-CM

## 2021-07-02 PROCEDURE — 92611 MOTION FLUOROSCOPY/SWALLOW: CPT | Mod: GN | Performed by: SPEECH-LANGUAGE PATHOLOGIST

## 2021-07-02 PROCEDURE — 92610 EVALUATE SWALLOWING FUNCTION: CPT | Mod: GN | Performed by: SPEECH-LANGUAGE PATHOLOGIST

## 2021-07-02 PROCEDURE — 74230 X-RAY XM SWLNG FUNCJ C+: CPT | Mod: GC | Performed by: RADIOLOGY

## 2021-07-02 RX ORDER — BARIUM SULFATE 400 MG/ML
25 SUSPENSION ORAL ONCE
Status: COMPLETED | OUTPATIENT
Start: 2021-07-02 | End: 2021-07-02

## 2021-07-02 RX ADMIN — BARIUM SULFATE 25 ML: 400 SUSPENSION ORAL at 11:56

## 2021-07-02 NOTE — PROGRESS NOTES
Speech-Language Pathology Department   EVALUATION  [facility]  07/02/21 1100       Present No   General Information   Type Of Visit Initial   Start Of Care Date 07/02/21   Referring Physician Dr Douglas Caicedo Evaluate And Treat   Medical Diagnosis Oropharyngeal dysphagia   Onset Of Illness/injury Or Date Of Surgery 07/02/21   Precautions/limitations Swallowing Precautions;Fall Precautions   Hearing Unable to assess. Pt presents with cognitive impairments and unable to follow commands   Pertinent History of Current Problem/OT: Additional Occupational Profile Info Pt is a 40 year old female that presents for a video swallow study to assess pts severity of dysphagia. Pt presents with intellectual disability associated with cerebral palsy. Pt is non-verbal and unable to follow commands. Pt lives in a group home. Pt was accompanied by her caregiver today. Pts caregiver reports that Pt has had increased episodes of choking on solids and sneezing during meals. Pts caregiver reports that Pt is coughing throughout all of her meals. Staff at group home mince all of pts foods. Pt is drinking thin liquids from a straw.  Pt had VFSS on 10/25/18 with noemy aspiration of thin and nectar thick liquids, pt chose to remain on thin liquids knowing the risk of aspiration pneumonia   Respiratory Status Room air   Prior Level Of Function Swallowing   Prior Level Of Function Comment Pureed/minced and moist diet with thin liquids   Patient Role/employment History Unemployed   Living Environment Group Home   General Observations Pt presents non-verbal, unable to follow basic commands and reduced participation in video swallow study   Patient/family Goals To reduce coughing during meals   Clinical Swallow Evaluation   Oral Musculature unable to assess due to poor participation/comprehension   Structural Abnormalities none present   Dentition present and adequate    Mucosal Quality adequate   Mandibular Strength and Mobility other (see comments)  (Unable to assess due to poor participation )   Additional Documentation No   Additional evaluation(s) completed today Yes   Rationale for completing additional evaluation Video swallow study to further assess pharyngeal dysphagia   VFSS Evaluation   VFSS Additional Documentation Yes   VFSS Eval: Radiology   Radiologist Resident   Views Taken left lateral   Physical Location of Procedure [facility]   VFSS Eval: Thin Liquid Texture Trial   Mode of Presentation, Thin Liquid spoon;cup;fed by clinician   Order of Presentation 1,7,8   Preparatory Phase Poor bolus control   Oral Phase, Thin Liquid Poor AP movement;Premature pharyngeal entry   Pharyngeal Phase, Thin Liquid Delayed swallow reflex   Rosenbek's Penetration Aspiration Scale: Thin Liquid Trial Results 6 - contrast passes glottis, no subglottic residue remains (aspiration)   Response to Aspiration absent response, silent aspiration   Diagnostic Statement Inability to view the initial trial of thin due to patient movement, suspect aspiration due to visable residual in airway following the swallow. Subsequent trials of thin were also unable to be fully viewed due to patients inability to sit still for exam.    VFSS Eval: Puree Solid Texture Trial   Mode of Presentation, Puree spoon;fed by clinician   Order of Presentation 2, 6   Preparatory Phase Poor bolus control   Oral Phase, Puree Poor AP movement;Premature pharyngeal entry   Pharyngeal Phase, Puree Delayed swallow reflex   Rosenbek's Penetration Aspiration Scale: Puree Food Trial Results 1 - no aspiration, contrast does not enter airway   Diagnostic Statement No penetration or aspiration with puree   VFSS Eval: Solid Food Texture Trial   Mode of Presentation, Solid spoon;fed by clinician   Order of Presentation 3, 4, 5   Preparatory Phase Insufficient mastication;Poor bolus control   Oral Phase, Solid Poor AP  movement;Premature pharyngeal entry   Pharyngeal Phase, Solid Delayed swallow reflex;Pharyngeal wall coating   Rosenbek's Penetration Aspiration Scale: Solid Food Trial Results 2 - contrast enters airway, remains above the vocal cords, no residue remains (penetration)   Diagnostic Statement Penetration with solids, no aspiration.    Educational Assessment   Barriers to Learning Cognitive   Preferred Learning Style Other  (pt has baseline cognitive impairments )   Esophageal Phase of Swallow   Patient reports or presents with symptoms of esophageal dysphagia No   Esophageal sweep performed during today s vidofluoroscopic exam  No   Esophageal comments limited participation in swallow study   Swallow Eval: Clinical Impressions   Skilled Criteria for Therapy Intervention Does not meet criteria for skilled intervention   Functional Assessment Scale (FAS) 2   Dysphagia Outcome Severity Scale (ASAEL) Level 3 - ASAEL   Treatment Diagnosis Moderate to moderately-severe impairments   Diet texture recommendations Thin liquids;Dysphagia diet level 2   Recommended Feeding/Eating Techniques alternate between small bites and sips of food/liquid;maintain upright posture during/after eating for 30 mins;small sips/bites   Predicted Duration of Therapy Intervention (days/wks) Evaluation only   Anticipated Discharge Disposition home w/ assist   Risks and Benefits of Treatment have been explained. Yes   Patient, family and/or staff in agreement with Plan of Care Yes   Clinical Impression Comments Pt presents with moderate-severe oropharyngeal dysphagia. Pt demonstrates reduced mastication of solids, reduced bolus control, premature spillage of bolus into the vallecular space and pyriform sinus before a swallow is triggered. Delayed swallow response. Suspect aspiration of thin liquids however, unable to visualize due to patient movement and reduced participation during assessment. No coughing was observed after thin liquids. Pt took an  initial cup sip of thin and then refused to take any further with maximal encouragement from patients caregiver and SLP. Noted trace residuals in the trach, therefore, suspect pt did have an episode of silent aspiration with thin. Penetration was observed with solids. Minimal residue in the vallecula and pyriform sinus. Penetration and aspiration appear to be a result of delayed timing in the swallow trigger. Staff report that Pt does feed herself, eats at a fast pace and takes large sips. SLP educated pts caregiver on monitoring bite sizes, pace and alternate solids and liquids to prevent aspiration or choking. Caregiver has been educated on the risk of aspiration pneumonia with thin liquids.  Pt had a video swallow study completed 10/25/2018 with reports of noemy aspiration with thin and nectar thick liquids. Despite recommendations for honey thick at that time, Pt and POA decided to remain on thin liquids. Therefore, recommend continue with minced and moist diet with thin liquids. Pts caregiver demonstrate good comprehension of all information. Pt is planning follow up with Dr Cole on 7/6/21.    Total Session Time   SLP Eval: oral/pharyngeal swallow function, clinical minutes (32470) 15   SLP Eval: VideoFluoroscopic Swallow function Minutes (62037) 42   Total Evaluation Time 57     Thank you for the referral of Jenn Briggs. If you have any questions about this report, please contact me using the information below.    Janeth Harrison MS CCC-SLP   Speech Language Pathologist   Rehabilitation Services   [address] [address]   Phone # [phone]

## 2021-07-06 ENCOUNTER — VIRTUAL VISIT (OUTPATIENT)
Dept: OTOLARYNGOLOGY | Facility: CLINIC | Age: 41
End: 2021-07-06
Payer: MEDICARE

## 2021-07-06 VITALS — HEIGHT: 65 IN | WEIGHT: 135 LBS | BODY MASS INDEX: 22.49 KG/M2

## 2021-07-06 DIAGNOSIS — R13.10 DYSPHAGIA, UNSPECIFIED TYPE: Primary | ICD-10-CM

## 2021-07-06 PROCEDURE — 99212 OFFICE O/P EST SF 10 MIN: CPT | Mod: 95 | Performed by: OTOLARYNGOLOGY

## 2021-07-06 ASSESSMENT — MIFFLIN-ST. JEOR: SCORE: 1278.24

## 2021-07-06 ASSESSMENT — PAIN SCALES - GENERAL: PAINLEVEL: NO PAIN (0)

## 2021-07-06 NOTE — PATIENT INSTRUCTIONS
1.  You were seen in the ENT Clinic today by . If you have any questions or concerns after your appointment, please call [phone]. Press option #1 for scheduling related needs. Press option #3 for Nurse advice.    2.  Plan is to return to clinic as needed.      Judi Pike LPN  [phone]  [facility] Otolaryngology

## 2021-07-06 NOTE — LETTER
2021       RE: Jenn Briggs  [address]  [facility] [address]     Dear Colleague,    Thank you for referring your patient, Jenn Briggs, to the [facility] [place] at [facility]. Please see a copy of my visit note below.    Ruby is a 40 year old who is being evaluated via a billable video visit.      How would you like to obtain your AVS? MyChart  If the video visit is dropped, the invitation should be resent by: Text to cell phone:  [phone]  Will anyone else be joining your video visit? No      Video Start Time: 3:00  Video-Visit Details    Type of service:  Video Visit    Video End Time:3:05    Originating Location (pt. Location): Other [facility]    Distant Location (provider location):  [facility] [place]     Platform used for Video Visit: [facility]   at the [facility]   Otolaryngology Clinic     Patient: Jenn Briggs    MRN: [id number]    : 1980    Age/Gender: 40 year old female  Date of Service: 2021  Rendering Provider:   Abbey Cole MD     Chief Complaint   Dysphagia  Interval History   HISTORY OF PRESENT ILLNESS: Ms. Briggs is a 40 year old female is being followed for dysphagia for years.   she was initially seen on 21. Please refer to this note for full history.   Of note, she is non verbal and lives in a group home    Today, she, presents for evaluation with the , Rowena and her mom. History is obtained from both  - reports stable symptoms  - her dad has esophageal rings that need to be stretched    PAST MEDICAL HISTORY:   Past Medical History:   Diagnosis Date     Constipation      Dysphagia      Hyperlipemia      Mental retardation      Obsessive compulsive disorder      Restless leg syndrome      Seizure (H)        PAST SURGICAL HISTORY:   Past  Surgical History:   Procedure Laterality Date     EXAM UNDER ANESTHESIA PELVIC  3/28/2013    Procedure: EXAM UNDER ANESTHESIA PELVIC;  Pelvic Exam, Dental Exam,  Periodontal Therapy, Radiograph, Fenectomy , gingivectomy, floride treatment in the mouth.;  Surgeon: Nayely Bird MD;  Location: [facility] OR     EXAM UNDER ANESTHESIA, RESTORATIONS, EXTRACTION(S) DENTAL COMPLEX, COMBINED  3/28/2013    Procedure: COMBINED EXAM UNDER ANESTHESIA, RESTORATIONS, EXTRACTION(S) DENTAL COMPLEX;;  Surgeon: Roman Valera DDS;  Location: [facility] OR     EXAM UNDER ANESTHESIA, RESTORATIONS, EXTRACTION(S) DENTAL COMPLEX, COMBINED N/A 5/20/2021    Procedure: Periodontal Therapy, Fluoride, Varnish in Mouth, Bilateral Dental Exam, Radiographs;  Surgeon: Nate Rajan DDS;  Location: [facility] OR     EXAM UNDER ANESTHESIA, RESTORATIONS, EXTRACTION(S) DENTAL, COMBINED N/A 10/29/2015    Procedure: COMBINED EXAM UNDER ANESTHESIA, RESTORATIONS, EXTRACTION(S) DENTAL;  Surgeon: Staci Aguillon DDS;  [facility]     EXAM UNDER ANESTHESIA, RESTORATIONS, EXTRACTION(S) DENTAL, COMBINED N/A 9/11/2017    Procedure: COMBINED EXAM UNDER ANESTHESIA, RESTORATIONS, EXTRACTION(S) DENTAL;  Dental Exam Radiographs, Periodontal Therapy, Floride Varnish, deep cleaning;  Surgeon: Ivan Adair DDS;  [facility] OR     EXAM UNDER ANESTHESIA, RESTORATIONS, EXTRACTION(S) DENTAL, COMBINED Bilateral 3/28/2019    Procedure: Bilateral Dental Exam, Radiographs, Periodontal Therapy, Fluoride Varnish;  Surgeon: Staci Aguillon DDS;  Location: [facility] OR       CURRENT MEDICATIONS:   Current Outpatient Medications:      bisacodyl (DULCOLAX) 10 MG suppository, Place 10 mg rectally daily as needed., Disp: , Rfl:      calcium carbonate (OS- MG [place]. CA) 500 MG tablet, Take 500 mg by mouth 2 times daily., Disp: , Rfl:      carbamide peroxide (DEBROX) 6.5 % otic solution, 5 drops daily as needed., Disp: , Rfl:      cholecalciferol (VITAMIN D)  400 UNIT TABS, Take 400 Units by mouth daily., Disp: , Rfl:      escitalopram (LEXAPRO) 20 MG tablet, Take 20 mg by mouth daily., Disp: , Rfl:      ibuprofen (ADVIL,MOTRIN) 200 MG tablet, Take 200 mg by mouth every 6 hours as needed., Disp: , Rfl:      lactulose (CHRONULAC) 10 GM/15ML solution, Take 20 g by mouth. 15cc daily, Disp: , Rfl:      LevETIRAcetam (KEPPRA PO), Take 500 mg by mouth 2 times daily, Disp: , Rfl:      lisinopril (ZESTRIL) 10 MG tablet, Take 10 mg by mouth daily, Disp: , Rfl:      loratadine (CLARITIN) 10 MG tablet, Take 10 mg by mouth daily., Disp: , Rfl:      melatonin 5 MG tablet, Take 5 mg by mouth nightly as needed for sleep, Disp: , Rfl:      norgestrel-ethinyl estradiol (LO/OVRAL) 0.3-30 MG-MCG per tablet, Take 1 tablet by mouth., Disp: , Rfl:      polyethylene glycol (MIRALAX/GLYCOLAX) powder, Take 17 g by mouth 2 times daily., Disp: , Rfl:      Saline (SODIUM CHLORIDE NA), Spray  in nostril as needed., Disp: , Rfl:     ALLERGIES: Pcn [penicillins]    SOCIAL HISTORY:    Social History     Socioeconomic History     Marital status: Single     Spouse name: Not on file     Number of children: Not on file     Years of education: Not on file     Highest education level: Not on file   Occupational History     Not on file   Social Needs     Financial resource strain: Not on file     Food insecurity     Worry: Not on file     Inability: Not on file     Transportation needs     Medical: Not on file     Non-medical: Not on file   Tobacco Use     Smoking status: Never Smoker     Smokeless tobacco: Never Used   Substance and Sexual Activity     Alcohol use: No     Drug use: No     Sexual activity: Not on file   Lifestyle     Physical activity     Days per week: Not on file     Minutes per session: Not on file     Stress: Not on file   Relationships     Social connections     Talks on phone: Not on file     Gets together: Not on file     Attends Buddhism service: Not on file     Active member of club  or organization: Not on file     Attends meetings of clubs or organizations: Not on file     Relationship status: Not on file     Intimate partner violence     Fear of current or ex partner: Not on file     Emotionally abused: Not on file     Physically abused: Not on file     Forced sexual activity: Not on file   Other Topics Concern     Not on file   Social History Narrative     Not on file         FAMILY HISTORY: No family history on file.   Non-contributory for problems with anesthesia    REVIEW OF SYSTEMS:   The patient was asked a 14 point review of systems regarding constitutional symptoms, eye symptoms, ears, nose, mouth, throat symptoms, cardiovascular symptoms, respiratory symptoms, gastrointestinal symptoms, genitourinary symptoms, musculoskeletal symptoms, integumentary symptoms, neurological symptoms, psychiatric symptoms, endocrine symptoms, hematologic/lymphatic symptoms, and allergic/ immunologic symptoms.   The pertinent factors have been included in the HPI and below.  Patient Supplied Answers to Review of Systems  No flowsheet data found.    Physical Examination   The patient underwent a physical examination as described below. The pertinent positive and negative findings are summarized after the description of the examination.  Constitutional: The patient's developmental and nutritional status was assessed. The patient's voice quality was assessed.  Head and Face: The head and face were inspected for deformities. Facial muscle strength was assessed bilaterally.  Eyes: Extraocular movements and primary gaze alignment were assessed.  Ears, Nose, Mouth and Throat: The ears and nose were examined for deformities.The lips were examined for abnormalities.   Neck: The neck was visualized for abnormal neck masses  Respiratory: The nature of the breathing was observed.  Extremities: The hand extremities were examined for any clubbing or cyanosis.  Skin: The skin was examined for inflammatory or neoplastic  conditions.  Neurologic: The patient's orientation, mood, and affect were noted. The cranial nerve  functions were examined.  Other pertinent positive and negative findings on physical examination:   Breathing comfortably on room air, no stridor with deep inspiration  throat clearing throughout the visit     All other physical examination findings were within normal limits and noncontributory     Review of Relevant Clinical Data   Notes:   Video Esophagram Review Rounds  Imaging Review of MBSS conducted with attending physician Abbey Cole and reviewed/discussed with SLP Lucia Palomino, Angelica Denson, Noemy Bailon and/or Liliam Chilel     Relevant Background:           MBSS Date: 7/2/21     Findings:  Pharyngeal Weakness:Yes  Epiglottic dysfunction: No  Penetration: Yes  Aspiration: Yes  UES dysfunction: No  Details: difficult to fully visualize the patient had moved around during the swallow has premature spillage and no UES dysfunction        Recommendations:  Diet: honey thick liquids, DD2 soft diet   More support and pacing        Labs:  No results found for: TSH  No results found for: NA, CO2, BUN, CREAT, GLUCOSE, PHOS  Lab Results   Component Value Date    WBC 9.3 03/28/2013    HGB 12.7 03/28/2013    HCT 38.2 03/28/2013    MCV 93 03/28/2013     03/28/2013     Lab Results   Component Value Date    PTT 30 03/28/2013    INR 1.03 03/28/2013     No results found for: MABEL  No components found for: RHEUMATOIDFACTOR,  RF  No results found for: CRP  No components found for: CKTOT, URICACID  No components found for: C3, C4, DSDNAAB, NDNAABIFA  No results found for: MPOAB    Patient reported Quality of Life (QOL) Measures   Patient Supplied Answers To VHI Questionnaire  No flowsheet data found.      Patient Supplied Answers To EAT Questionnaire  No flowsheet data found.      Patient Supplied Answers To CSI Questionnaire  No flowsheet data found.      Patient Supplied Answers to Dyspnea Index  Questionnaire:  No flowsheet data found.     Impression & Plan     IMPRESSION: Ms. Briggs is a 40 year old female who is being seen for the followin. Dysphagia  - had Xray Video Swallow Exam and esophagram in  - too old to see results  - long standing  - pureed food for 10 years  - has coughing and choking with food  - eats with an assistant  - no PNAs  - no changes in weight  - reviewed Xray Video Swallow Exam from 21 - difficult to fully visualize the patient had moved around during the swallow has premature spillage and poor coordination and no UES dysfunction  - discussed no UES dysfunction   - mom asked about esophageal rings and discussed these would be a problem if she were eating solid foods but since food did not regurgitate or back up then the limiting step is at the larynx   Plan  - continue modified diet and assisted eating         RETURN VISIT: as needed      Abbey Cole MD    Laryngology    [facility]  Department of  Otolaryngology - Head and Neck Surgery  Clinics & Surgery Center  [address] [place] [id number]  Appointment line: [phone]  Fax: [phone]

## 2021-07-06 NOTE — NURSING NOTE
"Chief Complaint   Patient presents with     RECHECK     follow up       Height 1.651 m (5' 5\"), weight 61.2 kg (135 lb).    Gagan Virgen, EMT  "

## 2021-07-06 NOTE — PROGRESS NOTES
Ruby is a 40 year old who is being evaluated via a billable video visit.      How would you like to obtain your AVS? MyChart  If the video visit is dropped, the invitation should be resent by: Text to cell phone:  [phone]  Will anyone else be joining your video visit? No      Video Start Time: 3:00  Video-Visit Details    Type of service:  Video Visit    Video End Time:3:05    Originating Location (pt. Location): Other prison    Distant Location (provider location):  [facility] [place]     Platform used for Video Visit: Captive[employer]        [facility]   at the [facility]   Otol[facility]ology [facility]     Patient: Jenn Briggs    MRN: [id number]    : 1980    Age/Gender: 40 year old female  Date of Service: 2021  Rendering Provider:   Abbey Cole MD     Chief Complaint   Dysphagia  Interval History   HISTORY OF PRESENT ILLNESS: Ms. Briggs is a 40 year old female is being followed for dysphagia for years.   she was initially seen on 21. Please refer to this note for full history.   Of note, she is non verbal and lives in a group home    Today, she, presents for evaluation with the , Rowena and her mom. History is obtained from both  - reports stable symptoms  - her dad has esophageal rings that need to be stretched    PAST MEDICAL HISTORY:   Past Medical History:   Diagnosis Date     Constipation      Dysphagia      Hyperlipemia      Mental retardation      Obsessive compulsive disorder      Restless leg syndrome      Seizure (H)        PAST SURGICAL HISTORY:   Past Surgical History:   Procedure Laterality Date     EXAM UNDER ANESTHESIA PELVIC  3/28/2013    Procedure: EXAM UNDER ANESTHESIA PELVIC;  Pelvic Exam, Dental Exam,  Periodontal Therapy, Radiograph, Fenectomy , gingivectomy, floride treatment in the mouth.;  Surgeon: Nayely Bird MD;  [facility]     EXAM UNDER ANESTHESIA, RESTORATIONS,  EXTRACTION(S) DENTAL COMPLEX, COMBINED  3/28/2013    Procedure: COMBINED EXAM UNDER ANESTHESIA, RESTORATIONS, EXTRACTION(S) DENTAL COMPLEX;;  Surgeon: Roman Valera DDS;  [facility]     EXAM UNDER ANESTHESIA, RESTORATIONS, EXTRACTION(S) DENTAL COMPLEX, COMBINED N/A 5/20/2021    Procedure: Periodontal Therapy, Fluoride, Varnish in Mouth, Bilateral Dental Exam, Radiographs;  Surgeon: Nate Rajan DDS;  [facility]     EXAM UNDER ANESTHESIA, RESTORATIONS, EXTRACTION(S) DENTAL, COMBINED N/A 10/29/2015    Procedure: COMBINED EXAM UNDER ANESTHESIA, RESTORATIONS, EXTRACTION(S) DENTAL;  Surgeon: Staci Aguillon DDS;  [facility]     EXAM UNDER ANESTHESIA, RESTORATIONS, EXTRACTION(S) DENTAL, COMBINED N/A 9/11/2017    Procedure: COMBINED EXAM UNDER ANESTHESIA, RESTORATIONS, EXTRACTION(S) DENTAL;  Dental Exam Radiographs, Periodontal Therapy, Floride Varnish, deep cleaning;  Surgeon: Ivan Adair DDS;  [facility]     EXAM UNDER ANESTHESIA, RESTORATIONS, EXTRACTION(S) DENTAL, COMBINED Bilateral 3/28/2019    Procedure: Bilateral Dental Exam, Radiographs, Periodontal Therapy, Fluoride Varnish;  Surgeon: Staci Aguillon DDS;  [facility]       CURRENT MEDICATIONS:   Current Outpatient Medications:      bisacodyl (DULCOLAX) 10 MG suppository, Place 10 mg rectally daily as needed., Disp: , Rfl:      calcium carbonate (OS- MG [place]. [place]) 500 MG tablet, Take 500 mg by mouth 2 times daily., Disp: , Rfl:      carbamide peroxide (DEBROX) 6.5 % otic solution, 5 drops daily as needed., Disp: , Rfl:      cholecalciferol (VITAMIN D) 400 UNIT TABS, Take 400 Units by mouth daily., Disp: , Rfl:      escitalopram (LEXAPRO) 20 MG tablet, Take 20 mg by mouth daily., Disp: , Rfl:      ibuprofen (ADVIL,MOTRIN) 200 MG tablet, Take 200 mg by mouth every 6 hours as needed., Disp: , Rfl:      lactulose (CHRONULAC) 10 GM/15ML solution, Take 20 g by mouth. 15cc daily, Disp: , Rfl:       LevETIRAcetam (KEPPRA PO), Take 500 mg by mouth 2 times daily, Disp: , Rfl:      lisinopril (ZESTRIL) 10 MG tablet, Take 10 mg by mouth daily, Disp: , Rfl:      loratadine (CLARITIN) 10 MG tablet, Take 10 mg by mouth daily., Disp: , Rfl:      melatonin 5 MG tablet, Take 5 mg by mouth nightly as needed for sleep, Disp: , Rfl:      norgestrel-ethinyl estradiol (LO/OVRAL) 0.3-30 MG-MCG per tablet, Take 1 tablet by mouth., Disp: , Rfl:      polyethylene glycol (MIRALAX/GLYCOLAX) powder, Take 17 g by mouth 2 times daily., Disp: , Rfl:      Saline (SODIUM CHLORIDE NA), Spray  in nostril as needed., Disp: , Rfl:     ALLERGIES: Pcn [penicillins]    SOCIAL HISTORY:    Social History     Socioeconomic History     Marital status: Single     Spouse name: Not on file     Number of children: Not on file     Years of education: Not on file     Highest education level: Not on file   Occupational History     Not on file   Social Needs     Financial resource strain: Not on file     Food insecurity     Worry: Not on file     Inability: Not on file     Transportation needs     Medical: Not on file     Non-medical: Not on file   Tobacco Use     Smoking status: Never Smoker     Smokeless tobacco: Never Used   Substance and Sexual Activity     Alcohol use: No     Drug use: No     Sexual activity: Not on file   Lifestyle     Physical activity     Days per week: Not on file     Minutes per session: Not on file     Stress: Not on file   Relationships     Social connections     Talks on phone: Not on file     Gets together: Not on file     Attends L[employer]-day service: Not on file     Active member of club or organization: Not on file     Attends meetings of clubs or organizations: Not on file     Relationship status: Not on file     Intimate partner violence     Fear of current or ex partner: Not on file     Emotionally abused: Not on file     Physically abused: Not on file     Forced sexual activity: Not on file   Other Topics Concern     Not  on file   Social History Narrative     Not on file         FAMILY HISTORY: No family history on file.   Non-contributory for problems with anesthesia    REVIEW OF SYSTEMS:   The patient was asked a 14 point review of systems regarding constitutional symptoms, eye symptoms, ears, nose, mouth, throat symptoms, cardiovascular symptoms, respiratory symptoms, gastrointestinal symptoms, genitourinary symptoms, musculoskeletal symptoms, integumentary symptoms, neurological symptoms, psychiatric symptoms, endocrine symptoms, hematologic/lymphatic symptoms, and allergic/ immunologic symptoms.   The pertinent factors have been included in the HPI and below.  Patient Supplied Answers to Review of Systems  No flowsheet data found.    Physical Examination   The patient underwent a physical examination as described below. The pertinent positive and negative findings are summarized after the description of the examination.  Constitutional: The patient's developmental and nutritional status was assessed. The patient's voice quality was assessed.  Head and Face: The head and face were inspected for deformities. Facial muscle strength was assessed bilaterally.  Eyes: Extraocular movements and primary gaze alignment were assessed.  Ears, Nose, Mouth and Throat: The ears and nose were examined for deformities.The lips were examined for abnormalities.   Neck: The neck was visualized for abnormal neck masses  Respiratory: The nature of the breathing was observed.  Extremities: The hand extremities were examined for any clubbing or cyanosis.  Skin: The skin was examined for inflammatory or neoplastic conditions.  Neurologic: The patient's orientation, mood, and affect were noted. The cranial nerve  functions were examined.  Other pertinent positive and negative findings on physical examination:   Breathing comfortably on room air, no stridor with deep inspiration  throat clearing throughout the visit     All other physical examination  findings were within normal limits and noncontributory     Review of Relevant Clinical Data   Notes:   Video Esophagram Review Rounds  Imaging Review of MBSS conducted with attending physician Abbey Cole and reviewed/discussed with SLP Lucia Palomino, Angelica Denson, Noemy Bailon and/or Liliam Chilel     Relevant Background:           MBSS Date: 21     Findings:  Pharyngeal Weakness:Yes  Epiglottic dysfunction: No  Penetration: Yes  Aspiration: Yes  UES dysfunction: No  Details: difficult to fully visualize the patient had moved around during the swallow has premature spillage and no UES dysfunction        Recommendations:  Diet: honey thick liquids, DD2 soft diet   More support and pacing        Labs:  No results found for: TSH  No results found for: NA, CO2, BUN, CREAT, GLUCOSE, PHOS  Lab Results   Component Value Date    WBC 9.3 2013    HGB 12.7 2013    HCT 38.2 2013    MCV 93 2013     2013     Lab Results   Component Value Date    PTT 30 2013    INR 1.03 2013     No results found for: MABEL  No components found for: RHEUMATOIDFACTOR,  RF  No results found for: CRP  No components found for: CKTOT, URICACID  No components found for: C3, C4, DSDNAAB, NDNAABIFA  No results found for: MPOAB    Patient reported Quality of Life (QOL) Measures   Patient Supplied Answers To VHI Questionnaire  No flowsheet data found.      Patient Supplied Answers To EAT Questionnaire  No flowsheet data found.      Patient Supplied Answers To CSI Questionnaire  No flowsheet data found.      Patient Supplied Answers to Dyspnea Index Questionnaire:  No flowsheet data found.     Impression & Plan     IMPRESSION: Ms. Briggs is a 40 year old female who is being seen for the followin. Dysphagia  - had Xray Video Swallow Exam and esophagram in  - too old to see results  - long standing  - pureed food for 10 years  - has coughing and choking with food  - eats with an  assistant  - no PNAs  - no changes in weight  - reviewed Xray Video Swallow Exam from 7/2/21 - difficult to fully visualize the patient had moved around during the swallow has premature spillage and poor coordination and no UES dysfunction  - discussed no UES dysfunction   - mom asked about esophageal rings and discussed these would be a problem if she were eating solid foods but since food did not regurgitate or back up then the limiting step is at the larynx   Plan  - continue modified diet and assisted eating         RETURN VISIT: as needed      Abbey Cole MD    Laryngology    [facility]  Department of  Otolaryngology - Head and Neck Surgery  Cl[facility]  [address] [place] [id number]  Appointment line: [phone]  Fax: [phone]

## 2023-01-25 ENCOUNTER — ANESTHESIA EVENT (OUTPATIENT)
Dept: SURGERY | Facility: CLINIC | Age: 43
End: 2023-01-25
Payer: MEDICARE

## 2023-01-25 NOTE — ANESTHESIA PREPROCEDURE EVALUATION
Anesthesia Pre-Procedure Evaluation    Patient: Jenn Briggs   MRN: [id number] : 1980        Procedure : Procedure(s):  Bilateral dental exam, Dental radiographs, dental restorations, pulpotomy, root canal therapy, frenectomy, gingivectomy, Alveoloplasty, periodontal therapy, fluoride, varnish in the mouth, dental extractions          Past Medical History:   Diagnosis Date     Constipation      Dysphagia      Hyperlipemia      Mental retardation      Obsessive compulsive disorder      Restless leg syndrome      Seizure (H)       Past Surgical History:   Procedure Laterality Date     EXAM UNDER ANESTHESIA PELVIC  3/28/2013    Procedure: EXAM UNDER ANESTHESIA PELVIC;  Pelvic Exam, Dental Exam,  Periodontal Therapy, Radiograph, Fenectomy , gingivectomy, floride treatment in the mouth.;  Surgeon: Nayely Bird MD;  [facility]     EXAM UNDER ANESTHESIA, RESTORATIONS, EXTRACTION(S) DENTAL COMPLEX, COMBINED  3/28/2013    Procedure: COMBINED EXAM UNDER ANESTHESIA, RESTORATIONS, EXTRACTION(S) DENTAL COMPLEX;;  Surgeon: Roman Valera DDS;  [facility]     EXAM UNDER ANESTHESIA, RESTORATIONS, EXTRACTION(S) DENTAL COMPLEX, COMBINED N/A 2021    Procedure: Periodontal Therapy, Fluoride, Varnish in Mouth, Bilateral Dental Exam, Radiographs;  Surgeon: Nate Rajan DDS;  [facility]     EXAM UNDER ANESTHESIA, RESTORATIONS, EXTRACTION(S) DENTAL, COMBINED N/A 10/29/2015    Procedure: COMBINED EXAM UNDER ANESTHESIA, RESTORATIONS, EXTRACTION(S) DENTAL;  Surgeon: Staci Aguillon DDS;  [facility]     EXAM UNDER ANESTHESIA, RESTORATIONS, EXTRACTION(S) DENTAL, COMBINED N/A 2017    Procedure: COMBINED EXAM UNDER ANESTHESIA, RESTORATIONS, EXTRACTION(S) DENTAL;  Dental Exam Radiographs, Periodontal Therapy, Floride Varnish, deep cleaning;  Surgeon: Ivan Adair DDS;  [facility]     EXAM UNDER ANESTHESIA, RESTORATIONS, EXTRACTION(S) DENTAL, COMBINED  Bilateral 3/28/2019    Procedure: Bilateral Dental Exam, Radiographs, Periodontal Therapy, Fluoride Varnish;  Surgeon: Staci Aguillon DDS;  [facility]      Allergies   Allergen Reactions     Pcn [Penicillins]      unknown      Social History     Tobacco Use     Smoking status: Never     Smokeless tobacco: Never   Substance Use Topics     Alcohol use: No      Wt Readings from Last 1 Encounters:   07/06/21 61.2 kg (135 lb)           Physical Exam    Airway   unable to assess          Respiratory Devices and Support         Dental           Cardiovascular    unable to assess         Pulmonary    Unable to assess           Other findings: Placement Date: 05/20/21; Placement Time: 0731; Mask Ventilation: 1; Induction Type: Intravenous; Ease of Intubation: Easy; Technique: Direct laryngoscopy; ETT Type: CK tube; Tube Size: 6.5 mm; DL Blade Size: MAC 3; Grade View: 1; Adjucts: Magill Forceps; Placement Person: CRNA; Attempts: 1; Depth:  (curve of ETT)    OUTSIDE LABS:  CBC:   Lab Results   Component Value Date    WBC 9.3 03/28/2013    HGB 12.7 03/28/2013    HCT 38.2 03/28/2013     03/28/2013     BMP:   Lab Results   Component Value Date    POTASSIUM 4.0 03/28/2013     COAGS:   Lab Results   Component Value Date    PTT 30 03/28/2013    INR 1.03 03/28/2013     POC:   Lab Results   Component Value Date    BGM 86 05/20/2021    HCGS Negative 09/11/2017     HEPATIC: No results found for: ALBUMIN, PROTTOTAL, ALT, AST, GGT, ALKPHOS, BILITOTAL, BILIDIRECT, TERESA  OTHER: No results found for: PH, LACT, A1C, ONI, PHOS, MAG, LIPASE, AMYLASE, TSH, T4, T3, CRP, SED    Anesthesia Plan    ASA Status:  2   NPO Status:  NPO Appropriate    Anesthesia Type: General.     - Airway: ETT   Induction: Intravenous, Propofol.   Maintenance: Balanced.   Techniques and Equipment:     - Airway: Video-Laryngoscope, Nasal CK         Consents    Anesthesia Plan(s) and associated risks, benefits, and realistic alternatives discussed.  Questions answered and patient/representative(s) expressed understanding.    - Discussed:     - Discussed with:  Patient, Other (See Comment) (caregiver)      - Extended Intubation/Ventilatory Support Discussed: No.      - Patient is DNR/DNI Status: No    Use of blood products discussed: No .     Postoperative Care    Pain management: IV analgesics, Oral pain medications, Multi-modal analgesia.   PONV prophylaxis: Ondansetron (or other 5HT-3), Dexamethasone or Solumedrol, Background Propofol Infusion     Comments:    Other Comments: Pre op versed ordered if needed  Appears has been able to get pre op IV previously             Lio Lao MD

## 2023-01-26 ENCOUNTER — HOSPITAL ENCOUNTER (OUTPATIENT)
Facility: CLINIC | Age: 43
Discharge: HOME | End: 2023-01-26
Attending: DENTIST | Admitting: DENTIST
Payer: MEDICARE

## 2023-01-26 ENCOUNTER — ANESTHESIA (OUTPATIENT)
Dept: SURGERY | Facility: CLINIC | Age: 43
End: 2023-01-26
Payer: MEDICARE

## 2023-01-26 VITALS
HEIGHT: 65 IN | SYSTOLIC BLOOD PRESSURE: 122 MMHG | HEART RATE: 109 BPM | OXYGEN SATURATION: 95 % | BODY MASS INDEX: 23.07 KG/M2 | WEIGHT: 138.45 LBS | DIASTOLIC BLOOD PRESSURE: 77 MMHG | TEMPERATURE: 98.1 F | RESPIRATION RATE: 18 BRPM

## 2023-01-26 PROCEDURE — 250N000013 HC RX MED GY IP 250 OP 250 PS 637: Performed by: STUDENT IN AN ORGANIZED HEALTH CARE EDUCATION/TRAINING PROGRAM

## 2023-01-26 PROCEDURE — 710N000010 HC RECOVERY PHASE 1, LEVEL 2, PER MIN: Performed by: DENTIST

## 2023-01-26 PROCEDURE — 250N000013 HC RX MED GY IP 250 OP 250 PS 637: Performed by: DENTIST

## 2023-01-26 PROCEDURE — 370N000017 HC ANESTHESIA TECHNICAL FEE, PER MIN: Performed by: DENTIST

## 2023-01-26 PROCEDURE — 258N000003 HC RX IP 258 OP 636: Performed by: NURSE ANESTHETIST, CERTIFIED REGISTERED

## 2023-01-26 PROCEDURE — 360N000075 HC SURGERY LEVEL 2, PER MIN: Performed by: DENTIST

## 2023-01-26 PROCEDURE — 250N000011 HC RX IP 250 OP 636: Performed by: NURSE ANESTHETIST, CERTIFIED REGISTERED

## 2023-01-26 PROCEDURE — 999N000141 HC STATISTIC PRE-PROCEDURE NURSING ASSESSMENT: Performed by: DENTIST

## 2023-01-26 PROCEDURE — 710N000012 HC RECOVERY PHASE 2, PER MINUTE: Performed by: DENTIST

## 2023-01-26 PROCEDURE — 250N000009 HC RX 250: Performed by: NURSE ANESTHETIST, CERTIFIED REGISTERED

## 2023-01-26 PROCEDURE — 250N000025 HC SEVOFLURANE, PER MIN: Performed by: DENTIST

## 2023-01-26 PROCEDURE — 250N000009 HC RX 250: Performed by: DENTIST

## 2023-01-26 RX ORDER — ACETAMINOPHEN 325 MG/1
975 TABLET ORAL ONCE
Status: COMPLETED | OUTPATIENT
Start: 2023-01-26 | End: 2023-01-26

## 2023-01-26 RX ORDER — HYDROMORPHONE HYDROCHLORIDE 1 MG/ML
0.2 INJECTION, SOLUTION INTRAMUSCULAR; INTRAVENOUS; SUBCUTANEOUS EVERY 5 MIN PRN
Status: DISCONTINUED | OUTPATIENT
Start: 2023-01-26 | End: 2023-01-26 | Stop reason: HOSPADM

## 2023-01-26 RX ORDER — FENTANYL CITRATE 50 UG/ML
25 INJECTION, SOLUTION INTRAMUSCULAR; INTRAVENOUS
Status: DISCONTINUED | OUTPATIENT
Start: 2023-01-26 | End: 2023-01-26 | Stop reason: HOSPADM

## 2023-01-26 RX ORDER — ACETAMINOPHEN 160 MG
TABLET,DISINTEGRATING ORAL PRN
Status: DISCONTINUED | OUTPATIENT
Start: 2023-01-26 | End: 2023-01-26 | Stop reason: HOSPADM

## 2023-01-26 RX ORDER — LIDOCAINE HYDROCHLORIDE 20 MG/ML
INJECTION, SOLUTION INFILTRATION; PERINEURAL PRN
Status: DISCONTINUED | OUTPATIENT
Start: 2023-01-26 | End: 2023-01-26

## 2023-01-26 RX ORDER — ONDANSETRON 2 MG/ML
4 INJECTION INTRAMUSCULAR; INTRAVENOUS EVERY 30 MIN PRN
Status: DISCONTINUED | OUTPATIENT
Start: 2023-01-26 | End: 2023-01-26 | Stop reason: HOSPADM

## 2023-01-26 RX ORDER — MEPERIDINE HYDROCHLORIDE 25 MG/ML
12.5 INJECTION INTRAMUSCULAR; INTRAVENOUS; SUBCUTANEOUS EVERY 5 MIN PRN
Status: DISCONTINUED | OUTPATIENT
Start: 2023-01-26 | End: 2023-01-26 | Stop reason: HOSPADM

## 2023-01-26 RX ORDER — HYDRALAZINE HYDROCHLORIDE 20 MG/ML
2.5-5 INJECTION INTRAMUSCULAR; INTRAVENOUS EVERY 10 MIN PRN
Status: DISCONTINUED | OUTPATIENT
Start: 2023-01-26 | End: 2023-01-26 | Stop reason: HOSPADM

## 2023-01-26 RX ORDER — KETOROLAC TROMETHAMINE 30 MG/ML
INJECTION, SOLUTION INTRAMUSCULAR; INTRAVENOUS PRN
Status: DISCONTINUED | OUTPATIENT
Start: 2023-01-26 | End: 2023-01-26

## 2023-01-26 RX ORDER — HYDROMORPHONE HYDROCHLORIDE 1 MG/ML
0.4 INJECTION, SOLUTION INTRAMUSCULAR; INTRAVENOUS; SUBCUTANEOUS EVERY 5 MIN PRN
Status: DISCONTINUED | OUTPATIENT
Start: 2023-01-26 | End: 2023-01-26 | Stop reason: HOSPADM

## 2023-01-26 RX ORDER — KETOROLAC TROMETHAMINE 30 MG/ML
15 INJECTION, SOLUTION INTRAMUSCULAR; INTRAVENOUS
Status: DISCONTINUED | OUTPATIENT
Start: 2023-01-26 | End: 2023-01-26 | Stop reason: HOSPADM

## 2023-01-26 RX ORDER — ACETAMINOPHEN 325 MG/1
975 TABLET ORAL ONCE
Status: DISCONTINUED | OUTPATIENT
Start: 2023-01-26 | End: 2023-01-26 | Stop reason: HOSPADM

## 2023-01-26 RX ORDER — SODIUM CHLORIDE, SODIUM LACTATE, POTASSIUM CHLORIDE, CALCIUM CHLORIDE 600; 310; 30; 20 MG/100ML; MG/100ML; MG/100ML; MG/100ML
INJECTION, SOLUTION INTRAVENOUS CONTINUOUS PRN
Status: DISCONTINUED | OUTPATIENT
Start: 2023-01-26 | End: 2023-01-26

## 2023-01-26 RX ORDER — CHLORHEXIDINE GLUCONATE ORAL RINSE 1.2 MG/ML
SOLUTION DENTAL PRN
Status: DISCONTINUED | OUTPATIENT
Start: 2023-01-26 | End: 2023-01-26 | Stop reason: HOSPADM

## 2023-01-26 RX ORDER — ONDANSETRON 4 MG/1
4 TABLET, ORALLY DISINTEGRATING ORAL EVERY 30 MIN PRN
Status: DISCONTINUED | OUTPATIENT
Start: 2023-01-26 | End: 2023-01-26 | Stop reason: HOSPADM

## 2023-01-26 RX ORDER — LORAZEPAM 2 MG/ML
.5-1 INJECTION INTRAMUSCULAR
Status: DISCONTINUED | OUTPATIENT
Start: 2023-01-26 | End: 2023-01-26 | Stop reason: HOSPADM

## 2023-01-26 RX ORDER — OXYCODONE HYDROCHLORIDE 5 MG/1
10 TABLET ORAL EVERY 4 HOURS PRN
Status: DISCONTINUED | OUTPATIENT
Start: 2023-01-26 | End: 2023-01-26 | Stop reason: HOSPADM

## 2023-01-26 RX ORDER — EPHEDRINE SULFATE 50 MG/ML
INJECTION, SOLUTION INTRAMUSCULAR; INTRAVENOUS; SUBCUTANEOUS PRN
Status: DISCONTINUED | OUTPATIENT
Start: 2023-01-26 | End: 2023-01-26

## 2023-01-26 RX ORDER — PROPOFOL 10 MG/ML
INJECTION, EMULSION INTRAVENOUS PRN
Status: DISCONTINUED | OUTPATIENT
Start: 2023-01-26 | End: 2023-01-26

## 2023-01-26 RX ORDER — BACITRACIN ZINC 500 [USP'U]/G
OINTMENT TOPICAL PRN
Status: DISCONTINUED | OUTPATIENT
Start: 2023-01-26 | End: 2023-01-26 | Stop reason: HOSPADM

## 2023-01-26 RX ORDER — OXYCODONE HYDROCHLORIDE 5 MG/1
5 TABLET ORAL EVERY 4 HOURS PRN
Status: DISCONTINUED | OUTPATIENT
Start: 2023-01-26 | End: 2023-01-26 | Stop reason: HOSPADM

## 2023-01-26 RX ORDER — ACETAMINOPHEN 325 MG/1
975 TABLET ORAL ONCE
Status: DISCONTINUED | OUTPATIENT
Start: 2023-01-26 | End: 2023-01-26

## 2023-01-26 RX ORDER — HYDROXYZINE HYDROCHLORIDE 25 MG/1
25 TABLET, FILM COATED ORAL EVERY 6 HOURS PRN
Status: DISCONTINUED | OUTPATIENT
Start: 2023-01-26 | End: 2023-01-26 | Stop reason: HOSPADM

## 2023-01-26 RX ORDER — FENTANYL CITRATE 50 UG/ML
25 INJECTION, SOLUTION INTRAMUSCULAR; INTRAVENOUS EVERY 5 MIN PRN
Status: DISCONTINUED | OUTPATIENT
Start: 2023-01-26 | End: 2023-01-26 | Stop reason: HOSPADM

## 2023-01-26 RX ORDER — DEXAMETHASONE SODIUM PHOSPHATE 4 MG/ML
INJECTION, SOLUTION INTRA-ARTICULAR; INTRALESIONAL; INTRAMUSCULAR; INTRAVENOUS; SOFT TISSUE PRN
Status: DISCONTINUED | OUTPATIENT
Start: 2023-01-26 | End: 2023-01-26

## 2023-01-26 RX ORDER — DIMENHYDRINATE 50 MG/ML
25 INJECTION, SOLUTION INTRAMUSCULAR; INTRAVENOUS
Status: DISCONTINUED | OUTPATIENT
Start: 2023-01-26 | End: 2023-01-26 | Stop reason: HOSPADM

## 2023-01-26 RX ORDER — ONDANSETRON 2 MG/ML
INJECTION INTRAMUSCULAR; INTRAVENOUS PRN
Status: DISCONTINUED | OUTPATIENT
Start: 2023-01-26 | End: 2023-01-26

## 2023-01-26 RX ORDER — LIDOCAINE HYDROCHLORIDE AND EPINEPHRINE 10; 10 MG/ML; UG/ML
INJECTION, SOLUTION INFILTRATION; PERINEURAL PRN
Status: DISCONTINUED | OUTPATIENT
Start: 2023-01-26 | End: 2023-01-26 | Stop reason: HOSPADM

## 2023-01-26 RX ORDER — DEXMEDETOMIDINE HYDROCHLORIDE 4 UG/ML
INJECTION, SOLUTION INTRAVENOUS PRN
Status: DISCONTINUED | OUTPATIENT
Start: 2023-01-26 | End: 2023-01-26

## 2023-01-26 RX ORDER — SODIUM CHLORIDE, SODIUM LACTATE, POTASSIUM CHLORIDE, CALCIUM CHLORIDE 600; 310; 30; 20 MG/100ML; MG/100ML; MG/100ML; MG/100ML
INJECTION, SOLUTION INTRAVENOUS CONTINUOUS
Status: DISCONTINUED | OUTPATIENT
Start: 2023-01-26 | End: 2023-01-26 | Stop reason: HOSPADM

## 2023-01-26 RX ORDER — ALBUTEROL SULFATE 0.83 MG/ML
2.5 SOLUTION RESPIRATORY (INHALATION) EVERY 4 HOURS PRN
Status: DISCONTINUED | OUTPATIENT
Start: 2023-01-26 | End: 2023-01-26 | Stop reason: HOSPADM

## 2023-01-26 RX ORDER — LIDOCAINE 40 MG/G
CREAM TOPICAL
Status: DISCONTINUED | OUTPATIENT
Start: 2023-01-26 | End: 2023-01-26 | Stop reason: HOSPADM

## 2023-01-26 RX ORDER — FENTANYL CITRATE 50 UG/ML
50 INJECTION, SOLUTION INTRAMUSCULAR; INTRAVENOUS EVERY 5 MIN PRN
Status: DISCONTINUED | OUTPATIENT
Start: 2023-01-26 | End: 2023-01-26 | Stop reason: HOSPADM

## 2023-01-26 RX ORDER — DEXAMETHASONE SODIUM PHOSPHATE 4 MG/ML
4 INJECTION, SOLUTION INTRA-ARTICULAR; INTRALESIONAL; INTRAMUSCULAR; INTRAVENOUS; SOFT TISSUE
Status: DISCONTINUED | OUTPATIENT
Start: 2023-01-26 | End: 2023-01-26 | Stop reason: HOSPADM

## 2023-01-26 RX ORDER — LABETALOL HYDROCHLORIDE 5 MG/ML
10 INJECTION, SOLUTION INTRAVENOUS
Status: DISCONTINUED | OUTPATIENT
Start: 2023-01-26 | End: 2023-01-26 | Stop reason: HOSPADM

## 2023-01-26 RX ORDER — MIDAZOLAM HYDROCHLORIDE 2 MG/ML
20 SYRUP ORAL ONCE
Status: COMPLETED | OUTPATIENT
Start: 2023-01-26 | End: 2023-01-26

## 2023-01-26 RX ORDER — FENTANYL CITRATE 50 UG/ML
INJECTION, SOLUTION INTRAMUSCULAR; INTRAVENOUS PRN
Status: DISCONTINUED | OUTPATIENT
Start: 2023-01-26 | End: 2023-01-26

## 2023-01-26 RX ORDER — HALOPERIDOL 5 MG/ML
1 INJECTION INTRAMUSCULAR
Status: DISCONTINUED | OUTPATIENT
Start: 2023-01-26 | End: 2023-01-26 | Stop reason: HOSPADM

## 2023-01-26 RX ADMIN — MIDAZOLAM 1 MG: 1 INJECTION INTRAMUSCULAR; INTRAVENOUS at 07:22

## 2023-01-26 RX ADMIN — SODIUM CHLORIDE, POTASSIUM CHLORIDE, SODIUM LACTATE AND CALCIUM CHLORIDE: 600; 310; 30; 20 INJECTION, SOLUTION INTRAVENOUS at 07:32

## 2023-01-26 RX ADMIN — KETOROLAC TROMETHAMINE 20 MG: 30 INJECTION, SOLUTION INTRAMUSCULAR at 09:01

## 2023-01-26 RX ADMIN — MIDAZOLAM HYDROCHLORIDE 20 MG: 2 SYRUP ORAL at 06:56

## 2023-01-26 RX ADMIN — PHENYLEPHRINE HYDROCHLORIDE 100 MCG: 10 INJECTION INTRAVENOUS at 08:08

## 2023-01-26 RX ADMIN — PHENYLEPHRINE HYDROCHLORIDE 0.4 MCG/KG/MIN: 10 INJECTION INTRAVENOUS at 08:28

## 2023-01-26 RX ADMIN — PHENYLEPHRINE HYDROCHLORIDE 0.4 MCG/KG/MIN: 10 INJECTION INTRAVENOUS at 09:03

## 2023-01-26 RX ADMIN — LIDOCAINE HYDROCHLORIDE 80 MG: 20 INJECTION, SOLUTION INFILTRATION; PERINEURAL at 07:30

## 2023-01-26 RX ADMIN — PHENYLEPHRINE HYDROCHLORIDE 100 MCG: 10 INJECTION INTRAVENOUS at 08:11

## 2023-01-26 RX ADMIN — DEXAMETHASONE SODIUM PHOSPHATE 8 MG: 4 INJECTION, SOLUTION INTRA-ARTICULAR; INTRALESIONAL; INTRAMUSCULAR; INTRAVENOUS; SOFT TISSUE at 07:36

## 2023-01-26 RX ADMIN — ONDANSETRON 4 MG: 2 INJECTION INTRAMUSCULAR; INTRAVENOUS at 09:15

## 2023-01-26 RX ADMIN — FENTANYL CITRATE 25 MCG: 50 INJECTION, SOLUTION INTRAMUSCULAR; INTRAVENOUS at 09:17

## 2023-01-26 RX ADMIN — PROPOFOL 150 MG: 10 INJECTION, EMULSION INTRAVENOUS at 07:30

## 2023-01-26 RX ADMIN — PHENYLEPHRINE HYDROCHLORIDE 50 MCG: 10 INJECTION INTRAVENOUS at 08:01

## 2023-01-26 RX ADMIN — FENTANYL CITRATE 50 MCG: 50 INJECTION, SOLUTION INTRAMUSCULAR; INTRAVENOUS at 07:53

## 2023-01-26 RX ADMIN — Medication 40 MG: at 07:31

## 2023-01-26 RX ADMIN — DEXMEDETOMIDINE 8 MCG: 100 INJECTION, SOLUTION, CONCENTRATE INTRAVENOUS at 09:10

## 2023-01-26 RX ADMIN — SUGAMMADEX 200 MG: 100 INJECTION, SOLUTION INTRAVENOUS at 09:15

## 2023-01-26 RX ADMIN — PHENYLEPHRINE HYDROCHLORIDE 50 MCG: 10 INJECTION INTRAVENOUS at 07:57

## 2023-01-26 RX ADMIN — Medication 5 MG: at 08:22

## 2023-01-26 ASSESSMENT — ACTIVITIES OF DAILY LIVING (ADL)
ADLS_ACUITY_SCORE: 41
ADLS_ACUITY_SCORE: 37

## 2023-01-26 NOTE — BRIEF OP NOTE
[facility]    Brief Operative Note    Pre-operative diagnosis: Dental caries [K02.9]  Dental infection [K04.7]  Post-operative diagnosis Chronic generalized moderate to severe periodontal disease    Procedure: Procedure(s):  Bilateral dental exam, Dental radiographs, periodontal therapy, fluoride, varnish in the mouth, dental extractions x4  Surgeon: Surgeon(s) and Role:     * Dom Driscoll DDS - Primary     * Florentino Burris MD - Resident - Assisting     * Angelia Mauricio MD - Resident - Assisting  Anesthesia: General   Estimated Blood Loss: 7 ml    Drains: None  Specimens: * No specimens in log *  Findings:   None.  Complications: None.  Implants: * No implants in log *

## 2023-01-26 NOTE — OP NOTE
Dictation operative note     It was deemed necessary for this patient to be seen in the hospital operating room because pt is not able to be seen in clinic due to: intellectual disability and inability to cooperate in dental chair    Consent: Risks complications including but not limited to post-operative pain, swelling, bleeding, infection, temporary/permanent paresthesia/anesthesia of CN V3, lingual nerve, failure to resolve chief complaint. Patient and patient's guardian agrees to procedure as written, and patient signed consent.     Names:  The attending physician was Dom Driscoll DDS.  The first assistant dental resident was Angelia Mauricio DDS.  The second assistant dental resident was Florentino Burris DMD.   The anesthesiologist was Lio Lao MD   The scrub nurse was Karie Alvarez circulating nurse Aspen   The CRNA was Michelle Crawley    Summary:  Under general anesthesia, the following operations were performed in the mouth:   Bilateral dental examination   FMX radiographs were taken and reviewed  Adult prophy and fluoride varnish   Dental extraction     Diagnosis:  The pre-operative diagnosis was dental caries and gingivitis.    The post-operative diagnosis was dental caries and gingivitis.       General Anesthesia Start:  The patient was brought into the operating room and draped in the usual customary [facility] fashion. Following the time-out procedures, the patient was placed under General Anesthesia Care via Right Naris.   A moist throat pack was placed at: 0816    Observations:    Chronic generalized moderate to severe periodontal disease   Moderate plaque and slight calculus and slight bleeding upon probing.   Probing depth (mm):  Upper right: 3-5 mm   Upper left:  3-5 mm   Lower left: 3-5 mm  Lower right: 3-6 mm     It was deemed that teeth #18, 24, and 25 needed to be extracted due to extensive bone loss, and Class III mobility. After #18 is extracted, tooth #15 will be  non-functional and there will be a high probability that it will supra-erupt, so we decided to extract it as well. Explained the need for extracting these four teeth to the patient's legal guardians, and they agreed with the recommendation.      Local Anesthesia:  3 carpules of 2% Lidocaine w/ 1:100,000 epi Given via PSA, TARAS and local infiltration     The following procedures were performed:    Dental extraction of:   15 ( Upper left second molar)  18 ( Lower left second molar)  24 ( Lower left central incisor )  25 ( Lower right central incisor )    Extractions completed using a series of elevators and forceps. Digital compression performed.   Hemostasis achieved by the use of local gauze pressure.     Throat packed removed at: 0913  The oropharynx was inspected and found to be clear.   Estimated blood loss was(mL): 7  The attending doctor was present for the entire procedure.    Dental procedure Finish:  After the dental procedure, the patient was transferred to:  The patient s family was informed by the dental team about the dental findings and procedures performed.    Attestation:    **I, Dom Driscoll DDS, was present for the entire procedure, and agree with the resident's documentation of the case.

## 2023-01-26 NOTE — DISCHARGE INSTRUCTIONS

## 2023-01-26 NOTE — ANESTHESIA POSTPROCEDURE EVALUATION
Patient: Jenn Briggs    Procedure: Procedure(s):  Bilateral dental exam, Dental radiographs, periodontal therapy, fluoride, varnish in the mouth, dental extractions x4       Anesthesia Type:  General    Note:  Disposition: Outpatient   Postop Pain Control: Uneventful            Sign Out: Well controlled pain   PONV:    Neuro/Psych: Uneventful            Sign Out: Acceptable/Baseline neuro status   Airway/Respiratory: Uneventful            Sign Out: Acceptable/Baseline resp. status   CV/Hemodynamics: Uneventful            Sign Out: Acceptable CV status; No obvious hypovolemia; No obvious fluid overload   Other NRE:    DID A NON-ROUTINE EVENT OCCUR?            Last vitals:  Vitals Value Taken Time   /74 01/26/23 1000   Temp 36.7  C (98.1  F) 01/26/23 1000   Pulse 109 01/26/23 1000   Resp 18 01/26/23 1000   SpO2 94 % 01/26/23 1002   Vitals shown include unvalidated device data.    Electronically Signed By: Lio Lao MD  January 26, 2023  10:05 AM

## 2023-01-26 NOTE — ANESTHESIA CARE TRANSFER NOTE
Patient: Jenn Briggs    Procedure: Procedure(s):  Bilateral dental exam, Dental radiographs, periodontal therapy, fluoride, varnish in the mouth, dental extractions x4       Diagnosis: Dental caries [K02.9]  Dental infection [K04.7]  Diagnosis Additional Information: No value filed.    Anesthesia Type:   General     Note:    Oropharynx: oral airway in place  Level of Consciousness: drowsy  Oxygen Supplementation: face mask  Level of Supplemental Oxygen (L/min / FiO2): 8  Independent Airway: airway patency satisfactory and stable  Dentition: S/P dental procedure  Vital Signs Stable: post-procedure vital signs reviewed and stable  Report to RN Given: handoff report given  Patient transferred to: PACU    Handoff Report: Identifed the Patient, Identified the Reponsible Provider, Reviewed the pertinent medical history, Discussed the surgical course, Reviewed Intra-OP anesthesia mangement and issues during anesthesia, Set expectations for post-procedure period and Allowed opportunity for questions and acknowledgement of understanding      Vitals:  Vitals Value Taken Time   /57 01/26/23 0930   Temp 37.1  C (98.8  F) 01/26/23 0926   Pulse 100 01/26/23 0939   Resp 10 01/26/23 0940   SpO2 92 % 01/26/23 0944   Vitals shown include unvalidated device data.    Electronically Signed By: GALO Cross CRNA  January 26, 2023  9:45 AM

## 2023-01-26 NOTE — ANESTHESIA PROCEDURE NOTES
Airway       Patient location during procedure: OR       Procedure Start/Stop Times: 1/26/2023 7:33 AM  Staff -        CRNA: Michelle Crawley APRN CRNA       Performed By: CRNA  Consent for Airway        Urgency: elective  Indications and Patient Condition       Indications for airway management: shakila-procedural       Induction type:intravenous       Mask difficulty assessment: 1 - vent by mask    Final Airway Details       Final airway type: endotracheal airway       Successful airway: ETT - single, Nasal and CK  Endotracheal Airway Details        ETT size (mm): 6.5       Cuffed: yes       Successful intubation technique: direct laryngoscopy       DL Blade Type: Allen 2       Grade View of Cords: 1       Position: Right       ETT measured from: curve of nasal ck.       Bite block used: None    Post intubation assessment        Placement verified by: capnometry, equal breath sounds and chest rise        Number of attempts at approach: 1       Secured with: silk tape       Ease of procedure: easy       Dentition: Intact and Unchanged       Dental guard used and removed.    Medication(s) Administered   Medication Administration Time: 1/26/2023 7:33 AM

## 2024-04-05 ENCOUNTER — DOCUMENTATION ONLY (OUTPATIENT)
Dept: OTHER | Facility: CLINIC | Age: 44
End: 2024-04-05
Payer: MEDICARE

## 2024-12-23 ENCOUNTER — PREP FOR PROCEDURE (OUTPATIENT)
Dept: OBGYN | Facility: CLINIC | Age: 44
End: 2024-12-23
Payer: MEDICARE

## 2025-01-21 ENCOUNTER — ANESTHESIA EVENT (OUTPATIENT)
Dept: SURGERY | Facility: CLINIC | Age: 45
End: 2025-01-21
Payer: MEDICARE

## 2025-01-21 ASSESSMENT — ENCOUNTER SYMPTOMS: SEIZURES: 1

## 2025-01-21 NOTE — ANESTHESIA PREPROCEDURE EVALUATION
Anesthesia Pre-Procedure Evaluation    Patient: Jenn Briggs   MRN: [id number] : 1980        Procedure : Procedure(s):  Bilateral Dental Exam, Radiograph, Dental Restorations, Dental Extractions, Pulpotomy, Root Canal Therapy, Biopsy, Frenectomy, Gingivectomy, Alveoloplasty, Periodontal Therapy, Fluoride Varnish in the Mouth  EUA, PAP Smear, Breast Exam          Past Medical History:   Diagnosis Date    Constipation     Dysphagia     Hyperlipemia     Mental retardation     Obsessive compulsive disorder     Restless leg syndrome     Seizure (H)       Past Surgical History:   Procedure Laterality Date    EXAM UNDER ANESTHESIA PELVIC  3/28/2013    Procedure: EXAM UNDER ANESTHESIA PELVIC;  Pelvic Exam, Dental Exam,  Periodontal Therapy, Radiograph, Fenectomy , gingivectomy, floride treatment in the mouth.;  Surgeon: Nayely Bird MD;  [facility]    EXAM UNDER ANESTHESIA, RESTORATIONS, EXTRACTION(S) DENTAL COMPLEX, COMBINED  3/28/2013    Procedure: COMBINED EXAM UNDER ANESTHESIA, RESTORATIONS, EXTRACTION(S) DENTAL COMPLEX;;  Surgeon: Roman Valera DDS;  [facility]    EXAM UNDER ANESTHESIA, RESTORATIONS, EXTRACTION(S) DENTAL COMPLEX, COMBINED N/A 2021    Procedure: Periodontal Therapy, Fluoride, Varnish in Mouth, Bilateral Dental Exam, Radiographs;  Surgeon: Nate Rajan DDS;  [facility]    EXAM UNDER ANESTHESIA, RESTORATIONS, EXTRACTION(S) DENTAL COMPLEX, COMBINED N/A 2023    Procedure: Bilateral dental exam, Dental radiographs, periodontal therapy, fluoride, varnish in the mouth, dental extractions x4;  Surgeon: Dom Driscoll DDS;  [facility]    EXAM UNDER ANESTHESIA, RESTORATIONS, EXTRACTION(S) DENTAL, COMBINED N/A 10/29/2015    Procedure: COMBINED EXAM UNDER ANESTHESIA, RESTORATIONS, EXTRACTION(S) DENTAL;  Surgeon: Staci Aguillon DDS;  [facility]    EXAM UNDER ANESTHESIA, RESTORATIONS, EXTRACTION(S) DENTAL, COMBINED N/A 2017     Procedure: COMBINED EXAM UNDER ANESTHESIA, RESTORATIONS, EXTRACTION(S) DENTAL;  Dental Exam Radiographs, Periodontal Therapy, Floride Varnish, deep cleaning;  Surgeon: Ivan Adair DDS;  [facility]    EXAM UNDER ANESTHESIA, RESTORATIONS, EXTRACTION(S) DENTAL, COMBINED Bilateral 3/28/2019    Procedure: Bilateral Dental Exam, Radiographs, Periodontal Therapy, Fluoride Varnish;  Surgeon: Staci Aguillon DDS;  [facility]      Allergies   Allergen Reactions    Pcn [Penicillins]      unknown      Social History     Tobacco Use    Smoking status: Never    Smokeless tobacco: Never   Substance Use Topics    Alcohol use: No      Wt Readings from Last 1 Encounters:   01/26/23 62.8 kg (138 lb 7.2 oz)        Anesthesia Evaluation   Pt has had prior anesthetic. Type: General.    No history of anesthetic complications       ROS/MED HX  ENT/Pulmonary: Comment: Dental carries       Neurologic: Comment: Cerebral palsy    (+)       seizures,                  Developmental delay,       Cardiovascular:       METS/Exercise Tolerance:     Hematologic:       Musculoskeletal:       GI/Hepatic:       Renal/Genitourinary:       Endo:       Psychiatric/Substance Use: Comment: Obsessive compulsive disorder    (+) psychiatric history        Infectious Disease:       Malignancy:       Other:            Physical Exam    Airway   unable to assess          Respiratory Devices and Support         Dental    unable to assess        Cardiovascular          Rhythm and rate: regular and normal     Pulmonary           breath sounds clear to auscultation           OUTSIDE LABS:  CBC:   Lab Results   Component Value Date    WBC 9.3 03/28/2013    HGB 12.7 03/28/2013    HCT 38.2 03/28/2013     03/28/2013     BMP:   Lab Results   Component Value Date    POTASSIUM 4.0 03/28/2013     COAGS:   Lab Results   Component Value Date    PTT 30 03/28/2013    INR 1.03 03/28/2013     POC:   Lab Results   Component Value Date    BGM 86  "05/20/2021    HCGS Negative 09/11/2017     HEPATIC: No results found for: \"ALBUMIN\", \"PROTTOTAL\", \"ALT\", \"AST\", \"GGT\", \"ALKPHOS\", \"BILITOTAL\", \"BILIDIRECT\", \"TERESA\"  OTHER: No results found for: \"PH\", \"LACT\", \"A1C\", \"ONI\", \"PHOS\", \"MAG\", \"LIPASE\", \"AMYLASE\", \"TSH\", \"T4\", \"T3\", \"CRP\", \"SED\"    Anesthesia Plan    ASA Status:  2    NPO Status:  NPO Appropriate    Anesthesia Type: General.     - Airway: ETT   Induction: Intravenous, Propofol.   Maintenance: Balanced.        Consents    Anesthesia Plan(s) and associated risks, benefits, and realistic alternatives discussed. Questions answered and patient/representative(s) expressed understanding.     - Discussed:     - Discussed with:  Patient, Other (See Comment) (caregiver)      - Extended Intubation/Ventilatory Support Discussed: No.      - Patient is DNR/DNI Status: No     Use of blood products discussed: No .     Postoperative Care    Pain management: IV analgesics, Multi-modal analgesia, Oral pain medications.   PONV prophylaxis: Ondansetron (or other 5HT-3), Dexamethasone or Solumedrol     Comments:               GLEN PEARCE MD    I have reviewed the pertinent notes and labs in the chart from the past 30 days and (re)examined the patient.  Any updates or changes from those notes are reflected in this note.    Clinically Significant Risk Factors Present on Admission                                          "

## 2025-01-22 ENCOUNTER — ANESTHESIA (OUTPATIENT)
Dept: SURGERY | Facility: CLINIC | Age: 45
End: 2025-01-22
Payer: MEDICARE

## 2025-01-22 ENCOUNTER — HOSPITAL ENCOUNTER (OUTPATIENT)
Facility: CLINIC | Age: 45
Discharge: HOME | End: 2025-01-22
Attending: DENTIST | Admitting: DENTIST
Payer: MEDICARE

## 2025-01-22 VITALS
SYSTOLIC BLOOD PRESSURE: 126 MMHG | HEART RATE: 131 BPM | BODY MASS INDEX: 25.09 KG/M2 | DIASTOLIC BLOOD PRESSURE: 85 MMHG | WEIGHT: 150.79 LBS | RESPIRATION RATE: 18 BRPM | TEMPERATURE: 97.3 F | OXYGEN SATURATION: 92 %

## 2025-01-22 PROCEDURE — 370N000017 HC ANESTHESIA TECHNICAL FEE, PER MIN: Performed by: DENTIST

## 2025-01-22 PROCEDURE — 999N000141 HC STATISTIC PRE-PROCEDURE NURSING ASSESSMENT: Performed by: DENTIST

## 2025-01-22 PROCEDURE — G0145 SCR C/V CYTO,THINLAYER,RESCR: HCPCS | Performed by: DENTIST

## 2025-01-22 PROCEDURE — 87624 HPV HI-RISK TYP POOLED RSLT: CPT | Mod: GZ | Performed by: DENTIST

## 2025-01-22 PROCEDURE — 360N000075 HC SURGERY LEVEL 2, PER MIN: Performed by: DENTIST

## 2025-01-22 PROCEDURE — 258N000003 HC RX IP 258 OP 636: Performed by: NURSE ANESTHETIST, CERTIFIED REGISTERED

## 2025-01-22 PROCEDURE — 250N000009 HC RX 250: Performed by: NURSE ANESTHETIST, CERTIFIED REGISTERED

## 2025-01-22 PROCEDURE — 250N000011 HC RX IP 250 OP 636: Performed by: NURSE ANESTHETIST, CERTIFIED REGISTERED

## 2025-01-22 PROCEDURE — 250N000009 HC RX 250: Performed by: DENTIST

## 2025-01-22 PROCEDURE — 250N000011 HC RX IP 250 OP 636: Performed by: DENTIST

## 2025-01-22 PROCEDURE — 250N000013 HC RX MED GY IP 250 OP 250 PS 637: Performed by: DENTIST

## 2025-01-22 PROCEDURE — 57410 PELVIC EXAMINATION: CPT | Performed by: OBSTETRICS & GYNECOLOGY

## 2025-01-22 PROCEDURE — 250N000025 HC SEVOFLURANE, PER MIN: Performed by: DENTIST

## 2025-01-22 PROCEDURE — 710N000010 HC RECOVERY PHASE 1, LEVEL 2, PER MIN: Performed by: DENTIST

## 2025-01-22 RX ORDER — ONDANSETRON 4 MG/1
4 TABLET, ORALLY DISINTEGRATING ORAL EVERY 30 MIN PRN
Status: DISCONTINUED | OUTPATIENT
Start: 2025-01-22 | End: 2025-01-22 | Stop reason: HOSPADM

## 2025-01-22 RX ORDER — HYDROMORPHONE HYDROCHLORIDE 1 MG/ML
0.4 INJECTION, SOLUTION INTRAMUSCULAR; INTRAVENOUS; SUBCUTANEOUS EVERY 5 MIN PRN
Status: DISCONTINUED | OUTPATIENT
Start: 2025-01-22 | End: 2025-01-22 | Stop reason: HOSPADM

## 2025-01-22 RX ORDER — ACETAMINOPHEN 325 MG/1
975 TABLET ORAL ONCE
Status: DISCONTINUED | OUTPATIENT
Start: 2025-01-22 | End: 2025-01-22 | Stop reason: HOSPADM

## 2025-01-22 RX ORDER — SODIUM CHLORIDE, SODIUM LACTATE, POTASSIUM CHLORIDE, CALCIUM CHLORIDE 600; 310; 30; 20 MG/100ML; MG/100ML; MG/100ML; MG/100ML
INJECTION, SOLUTION INTRAVENOUS CONTINUOUS PRN
Status: DISCONTINUED | OUTPATIENT
Start: 2025-01-22 | End: 2025-01-22

## 2025-01-22 RX ORDER — ONDANSETRON 2 MG/ML
4 INJECTION INTRAMUSCULAR; INTRAVENOUS EVERY 30 MIN PRN
Status: DISCONTINUED | OUTPATIENT
Start: 2025-01-22 | End: 2025-01-22 | Stop reason: HOSPADM

## 2025-01-22 RX ORDER — OXYCODONE HYDROCHLORIDE 5 MG/1
5 TABLET ORAL
Status: DISCONTINUED | OUTPATIENT
Start: 2025-01-22 | End: 2025-01-22 | Stop reason: HOSPADM

## 2025-01-22 RX ORDER — MIDAZOLAM HYDROCHLORIDE 2 MG/ML
16 SYRUP ORAL
Status: DISCONTINUED | OUTPATIENT
Start: 2025-01-22 | End: 2025-01-22 | Stop reason: HOSPADM

## 2025-01-22 RX ORDER — NALOXONE HYDROCHLORIDE 0.4 MG/ML
0.1 INJECTION, SOLUTION INTRAMUSCULAR; INTRAVENOUS; SUBCUTANEOUS
Status: DISCONTINUED | OUTPATIENT
Start: 2025-01-22 | End: 2025-01-22 | Stop reason: HOSPADM

## 2025-01-22 RX ORDER — ACETAMINOPHEN 160 MG
TABLET,DISINTEGRATING ORAL PRN
Status: DISCONTINUED | OUTPATIENT
Start: 2025-01-22 | End: 2025-01-22 | Stop reason: HOSPADM

## 2025-01-22 RX ORDER — METOPROLOL TARTRATE 1 MG/ML
1-2 INJECTION, SOLUTION INTRAVENOUS EVERY 5 MIN PRN
Status: DISCONTINUED | OUTPATIENT
Start: 2025-01-22 | End: 2025-01-22 | Stop reason: HOSPADM

## 2025-01-22 RX ORDER — LIDOCAINE HYDROCHLORIDE 20 MG/ML
INJECTION, SOLUTION INFILTRATION; PERINEURAL PRN
Status: DISCONTINUED | OUTPATIENT
Start: 2025-01-22 | End: 2025-01-22

## 2025-01-22 RX ORDER — HYDRALAZINE HYDROCHLORIDE 20 MG/ML
2.5-5 INJECTION INTRAMUSCULAR; INTRAVENOUS EVERY 10 MIN PRN
Status: DISCONTINUED | OUTPATIENT
Start: 2025-01-22 | End: 2025-01-22 | Stop reason: HOSPADM

## 2025-01-22 RX ORDER — PROPOFOL 10 MG/ML
INJECTION, EMULSION INTRAVENOUS PRN
Status: DISCONTINUED | OUTPATIENT
Start: 2025-01-22 | End: 2025-01-22

## 2025-01-22 RX ORDER — FENTANYL CITRATE 50 UG/ML
INJECTION, SOLUTION INTRAMUSCULAR; INTRAVENOUS PRN
Status: DISCONTINUED | OUTPATIENT
Start: 2025-01-22 | End: 2025-01-22

## 2025-01-22 RX ORDER — FENTANYL CITRATE 50 UG/ML
50 INJECTION, SOLUTION INTRAMUSCULAR; INTRAVENOUS EVERY 5 MIN PRN
Status: DISCONTINUED | OUTPATIENT
Start: 2025-01-22 | End: 2025-01-22 | Stop reason: HOSPADM

## 2025-01-22 RX ORDER — DEXAMETHASONE SODIUM PHOSPHATE 4 MG/ML
4 INJECTION, SOLUTION INTRA-ARTICULAR; INTRALESIONAL; INTRAMUSCULAR; INTRAVENOUS; SOFT TISSUE
Status: DISCONTINUED | OUTPATIENT
Start: 2025-01-22 | End: 2025-01-22 | Stop reason: HOSPADM

## 2025-01-22 RX ORDER — SODIUM CHLORIDE, SODIUM LACTATE, POTASSIUM CHLORIDE, CALCIUM CHLORIDE 600; 310; 30; 20 MG/100ML; MG/100ML; MG/100ML; MG/100ML
INJECTION, SOLUTION INTRAVENOUS CONTINUOUS
Status: DISCONTINUED | OUTPATIENT
Start: 2025-01-22 | End: 2025-01-22 | Stop reason: HOSPADM

## 2025-01-22 RX ORDER — FENTANYL CITRATE 50 UG/ML
25 INJECTION, SOLUTION INTRAMUSCULAR; INTRAVENOUS EVERY 5 MIN PRN
Status: DISCONTINUED | OUTPATIENT
Start: 2025-01-22 | End: 2025-01-22 | Stop reason: HOSPADM

## 2025-01-22 RX ORDER — DEXAMETHASONE SODIUM PHOSPHATE 4 MG/ML
INJECTION, SOLUTION INTRA-ARTICULAR; INTRALESIONAL; INTRAMUSCULAR; INTRAVENOUS; SOFT TISSUE PRN
Status: DISCONTINUED | OUTPATIENT
Start: 2025-01-22 | End: 2025-01-22

## 2025-01-22 RX ORDER — ONDANSETRON 2 MG/ML
INJECTION INTRAMUSCULAR; INTRAVENOUS PRN
Status: DISCONTINUED | OUTPATIENT
Start: 2025-01-22 | End: 2025-01-22

## 2025-01-22 RX ORDER — LIDOCAINE HYDROCHLORIDE AND EPINEPHRINE 10; 10 MG/ML; UG/ML
INJECTION, SOLUTION INFILTRATION; PERINEURAL PRN
Status: DISCONTINUED | OUTPATIENT
Start: 2025-01-22 | End: 2025-01-22 | Stop reason: HOSPADM

## 2025-01-22 RX ORDER — EPHEDRINE SULFATE 50 MG/ML
INJECTION, SOLUTION INTRAMUSCULAR; INTRAVENOUS; SUBCUTANEOUS PRN
Status: DISCONTINUED | OUTPATIENT
Start: 2025-01-22 | End: 2025-01-22

## 2025-01-22 RX ORDER — KETOROLAC TROMETHAMINE 30 MG/ML
INJECTION, SOLUTION INTRAMUSCULAR; INTRAVENOUS PRN
Status: DISCONTINUED | OUTPATIENT
Start: 2025-01-22 | End: 2025-01-22

## 2025-01-22 RX ORDER — CHLORHEXIDINE GLUCONATE ORAL RINSE 1.2 MG/ML
SOLUTION DENTAL PRN
Status: DISCONTINUED | OUTPATIENT
Start: 2025-01-22 | End: 2025-01-22 | Stop reason: HOSPADM

## 2025-01-22 RX ORDER — DEXMEDETOMIDINE HYDROCHLORIDE 4 UG/ML
INJECTION, SOLUTION INTRAVENOUS PRN
Status: DISCONTINUED | OUTPATIENT
Start: 2025-01-22 | End: 2025-01-22

## 2025-01-22 RX ORDER — HYDROMORPHONE HYDROCHLORIDE 1 MG/ML
0.2 INJECTION, SOLUTION INTRAMUSCULAR; INTRAVENOUS; SUBCUTANEOUS EVERY 5 MIN PRN
Status: DISCONTINUED | OUTPATIENT
Start: 2025-01-22 | End: 2025-01-22 | Stop reason: HOSPADM

## 2025-01-22 RX ORDER — OXYMETAZOLINE HYDROCHLORIDE 0.05 G/100ML
SPRAY NASAL PRN
Status: DISCONTINUED | OUTPATIENT
Start: 2025-01-22 | End: 2025-01-22

## 2025-01-22 RX ADMIN — KETOROLAC TROMETHAMINE 30 MG: 30 INJECTION, SOLUTION INTRAMUSCULAR at 11:02

## 2025-01-22 RX ADMIN — FENTANYL CITRATE 100 MCG: 50 INJECTION INTRAMUSCULAR; INTRAVENOUS at 07:48

## 2025-01-22 RX ADMIN — Medication 30 MG: at 07:48

## 2025-01-22 RX ADMIN — ONDANSETRON 4 MG: 2 INJECTION INTRAMUSCULAR; INTRAVENOUS at 10:56

## 2025-01-22 RX ADMIN — PHENYLEPHRINE HYDROCHLORIDE 100 MCG: 10 INJECTION INTRAVENOUS at 08:46

## 2025-01-22 RX ADMIN — PHENYLEPHRINE HYDROCHLORIDE 200 MCG: 10 INJECTION INTRAVENOUS at 07:58

## 2025-01-22 RX ADMIN — EPHEDRINE SULFATE 5 MG: 5 INJECTION INTRAVENOUS at 09:28

## 2025-01-22 RX ADMIN — MIDAZOLAM 2 MG: 1 INJECTION INTRAMUSCULAR; INTRAVENOUS at 07:45

## 2025-01-22 RX ADMIN — OXYMETAZOLINE HYDROCHLORIDE 1 SPRAY: 0.05 SPRAY NASAL at 07:48

## 2025-01-22 RX ADMIN — EPHEDRINE SULFATE 5 MG: 5 INJECTION INTRAVENOUS at 08:28

## 2025-01-22 RX ADMIN — EPHEDRINE SULFATE 5 MG: 5 INJECTION INTRAVENOUS at 08:32

## 2025-01-22 RX ADMIN — PHENYLEPHRINE HYDROCHLORIDE 100 MCG: 10 INJECTION INTRAVENOUS at 07:51

## 2025-01-22 RX ADMIN — PHENYLEPHRINE HYDROCHLORIDE 100 MCG: 10 INJECTION INTRAVENOUS at 07:48

## 2025-01-22 RX ADMIN — DEXMEDETOMIDINE HYDROCHLORIDE 8 MCG: 4 INJECTION, SOLUTION INTRAVENOUS at 07:45

## 2025-01-22 RX ADMIN — SUGAMMADEX 200 MG: 100 INJECTION, SOLUTION INTRAVENOUS at 11:04

## 2025-01-22 RX ADMIN — HYDROMORPHONE HYDROCHLORIDE 0.2 MG: 1 INJECTION, SOLUTION INTRAMUSCULAR; INTRAVENOUS; SUBCUTANEOUS at 08:58

## 2025-01-22 RX ADMIN — PROPOFOL 150 MG: 10 INJECTION, EMULSION INTRAVENOUS at 07:48

## 2025-01-22 RX ADMIN — EPHEDRINE SULFATE 5 MG: 5 INJECTION INTRAVENOUS at 08:04

## 2025-01-22 RX ADMIN — DEXAMETHASONE SODIUM PHOSPHATE 4 MG: 4 INJECTION, SOLUTION INTRAMUSCULAR; INTRAVENOUS at 07:48

## 2025-01-22 RX ADMIN — PHENYLEPHRINE HYDROCHLORIDE 200 MCG: 10 INJECTION INTRAVENOUS at 07:54

## 2025-01-22 RX ADMIN — Medication 20 MG: at 08:55

## 2025-01-22 RX ADMIN — EPHEDRINE SULFATE 5 MG: 5 INJECTION INTRAVENOUS at 09:15

## 2025-01-22 RX ADMIN — LIDOCAINE HYDROCHLORIDE 60 MG: 20 INJECTION, SOLUTION INFILTRATION; PERINEURAL at 07:48

## 2025-01-22 RX ADMIN — SODIUM CHLORIDE, POTASSIUM CHLORIDE, SODIUM LACTATE AND CALCIUM CHLORIDE: 600; 310; 30; 20 INJECTION, SOLUTION INTRAVENOUS at 07:45

## 2025-01-22 ASSESSMENT — ACTIVITIES OF DAILY LIVING (ADL)
ADLS_ACUITY_SCORE: 35
ADLS_ACUITY_SCORE: 39

## 2025-01-22 NOTE — ANESTHESIA POSTPROCEDURE EVALUATION
Patient: Jenn Briggs    Procedure: Procedure(s):  Bilateral Dental Exam, dental Radiographs, Dental Extractions x4,  Periodontal Therapy, Fluoride Varnish in the Mouth  EUA, PAP Smear, Breast Exam       Anesthesia Type:  General    Note:  Disposition: Outpatient   Postop Pain Control: Uneventful            Sign Out: Well controlled pain   PONV: No   Neuro/Psych: Uneventful            Sign Out: Acceptable/Baseline neuro status   Airway/Respiratory: Uneventful            Sign Out: Acceptable/Baseline resp. status   CV/Hemodynamics: Uneventful            Sign Out: Acceptable CV status; No obvious hypovolemia; No obvious fluid overload   Other NRE: NONE   DID A NON-ROUTINE EVENT OCCUR? No           Last vitals:  Vitals Value Taken Time   /85 01/22/25 1130   Temp     Pulse 131 01/22/25 1145   Resp 18 01/22/25 1240   SpO2 92 % 01/22/25 1240       Electronically Signed By: GLEN PEARCE MD  January 22, 2025  1:01 PM

## 2025-01-22 NOTE — ANESTHESIA CARE TRANSFER NOTE
Patient: Jenn Briggs    Procedure: Procedure(s):  Bilateral Dental Exam, dental Radiographs, Dental Extractions x4,  Periodontal Therapy, Fluoride Varnish in the Mouth  EUA, PAP Smear, Breast Exam       Diagnosis: Dental caries [K02.9]  Diagnosis Additional Information: No value filed.    Anesthesia Type:   General     Note:    Oropharynx: oropharynx clear of all foreign objects and spontaneously breathing  Level of Consciousness: awake  Oxygen Supplementation: blow-by O2  Level of Supplemental Oxygen (L/min / FiO2): 8  Independent Airway: airway patency satisfactory and stable  Dentition: S/P dental procedure  Vital Signs Stable: post-procedure vital signs reviewed and stable  Report to RN Given: handoff report given  Patient transferred to: PACU    Handoff Report: Identifed the Patient, Identified the Reponsible Provider, Reviewed the pertinent medical history, Discussed the surgical course, Reviewed Intra-OP anesthesia mangement and issues during anesthesia, Set expectations for post-procedure period and Allowed opportunity for questions and acknowledgement of understanding      Vitals:  Vitals Value Taken Time   /85 01/22/25 1130   Temp     Pulse 131 01/22/25 1142   Resp 16 01/22/25 1142   SpO2 88 % 01/22/25 1140   Vitals shown include unfiled device data.    Electronically Signed By: GALO Curtis CRNA  January 22, 2025  11:49 AM

## 2025-01-22 NOTE — DISCHARGE INSTRUCTIONS
After Anesthesia (Sleep Medicine)  What should I do after anesthesia?  You should rest and relax for the next 24 hours. Avoid risky or difficult (strenuous) activity. A responsible adult should stay with you overnight.  Don't drive or use any heavy equipment for 24 hours. Even if you feel normal, your reactions may be affected by the sleep medicine given to you.  Don't drink alcohol or make any important decisions for 24 hours.  Slowly get back to your regular diet, as you feel able.  How should I expect to feel?  It's normal to feel dizzy, light-headed, or faint for up to a full day after anesthesia or while taking pain medicine. If this happens:   Sit down for a few minutes before standing.  Have someone help you when you get up to walk or use the bathroom.  If you have nausea (feel sick to your stomach) or vomit (throw up):   Drink clear liquids (such as apple juice, ginger ale, broth, or 7UP) until you feel better.  If you feel sick to your stomach, or you keep vomiting for 24 hours, please call the doctor.  What else should I know?  You might have a dry mouth, sore throat, muscle aches, or trouble sleeping. These should go away after 24 hours.  Please contact your doctor if you have any other symptoms that concern you, such as fever, pain, bleeding, fluid drainage, swelling, or headache, or if it's been over 8 to 10 hours and you still aren't able to pee (urinate).  If you have a history of sleep apnea, it's very important to use your CPAP machine for the next 24 hours when you nap or sleep.   For informational purposes only. Not to replace the advice of your health care provider. Copyright   2023 [employer]. All rights reserved. Clinically reviewed by Enrike Waller MD.     To contact a doctor, call Dr. Martin's clinic at [phone]  or:  '   [phone] and ask for the Resident On Call for          ent (answered 24 hours a day)  '   Emergency Department:  [facility]  [facility]:  [phone]

## 2025-01-22 NOTE — BRIEF OP NOTE
[facility]    Brief Operative Note    Pre-operative diagnosis: Dental caries [K02.9]  Post-operative diagnosis Advanced Periodontitis    Procedure: Bilateral Dental Exam, dental Radiographs, Dental Extractions x4,  Periodontal Therapy, Fluoride Varnish in the Mouth, N/A - Mouth  EUA, PAP Smear, Breast Exam, N/A - Vagina    Surgeon: Surgeons and Role:  Panel 1:     * Nancy Mota DDS - Primary     * Jade Clayton MD - Resident - Assisting  Panel 2:     * Rowena Martin MD - Primary  Anesthesia: General   Estimated Blood Loss: 10ml    Drains: None  Specimens: * No specimens in log *  Findings:   None.  Complications: None.  Implants: * No implants in log *

## 2025-01-22 NOTE — ANESTHESIA PROCEDURE NOTES
Airway       Patient location during procedure: OR       Procedure Start/Stop Times: 1/22/2025 7:51 AM  Staff -        CRNA: Miriam Cordero APRN CRNA       Performed By: CRNA  Consent for Airway        Urgency: elective  Indications and Patient Condition       Indications for airway management: shakila-procedural        Final Airway Details       Final airway type: endotracheal airway       Successful airway: ETT - single, Nasal and CK  Endotracheal Airway Details        ETT size (mm): 7.0       Cuffed: yes       Cuff volume (mL): 2       Successful intubation technique: video laryngoscopy       VL Blade Size: MAC 3       Grade View of Cords: 1       Adjucts: magill forceps       Position: Right       Measured from: nares       Secured at (cm): 26       Bite block used: None    Post intubation assessment        Placement verified by: capnometry, equal breath sounds and chest rise        Number of attempts at approach: 1       Number of other approaches attempted: 0       Secured with: tape       Ease of procedure: easy       Dentition: Intact and Unchanged    Medication(s) Administered   Medication Administration Time: 1/22/2025 7:51 AM

## 2025-01-22 NOTE — OP NOTE
PreOp Dx: Due for HCM and unable to tolerate exams in clinic  PostOp Dx: Same  Procedure: EUA, pap smear  EBL: none  Surgeon: Rowena Martin MD  Anesthesia: general  Specimen: Pap and HPV   Complications: none  Findings: Normal external genitalia, small anteverted uterus with nulliparous cervix. No vaginal discharge. No pelvic masses palpated. Marked limited external rotation of thighs despite general anesthesia with relaxation.   Indications: 45yo overdue for breast exam and pap smear with multiple developmental and medical comorbidities who is undergoing dental procedures with general anesthesia. Options, risks, benefits discussed at length with her guardian and affirmation of consent signed.   Detail:  After the brief, completion of dental procedures, and a time out for safety a breast exam was performed while in supine position. She was then gently moved to frog leg position (limited by lack of femur external rotation). A medium Liat speculum was placed and the cervix was visualized. Pap was obtained. Bimanual exam revealed no masses. Speculum was removed and patient repositioned to supine. Debrief was performed.     Rowena Martin MD

## 2025-01-23 LAB
HPV HR 12 DNA CVX QL NAA+PROBE: NEGATIVE
HPV16 DNA CVX QL NAA+PROBE: NEGATIVE
HPV18 DNA CVX QL NAA+PROBE: NEGATIVE
HUMAN PAPILLOMA VIRUS FINAL DIAGNOSIS: NORMAL

## 2025-01-23 NOTE — OP NOTE
[facility]   OR Dental Procedure Note    Patient:   Jenn Briggs    Date of birth 1980   MRN:  [id number]   Date of Visit:  01/22/2025   Date of Admission 1/22/2025     Treatment Completed   General Anesthesia Start:  Jenn Briggs is a 44 year old female brought into the operating room and draped in the usual customary [facility] fashion. Following the time-out procedures, the patient was placed under general anesthesia care via Right naris.    Under GA, the following treatments were performed:     FMX radiographs were taken and reviewed.  Moist throat pack was placed, betadine applied circumferentially to oral cavity.   Pre-procedural rinse included: Chlorhexidine 0.12% solution followed by a 50/50 solution of sterile saline and hydrogen peroxide.    Periodontal Treatment: Full mouth prophylaxis: Bulk debridement completed with the Cavitron, followed use of hand scalers as necessary. Slow speed polish with medium grit polishing paste. All contacts flossed.      Local Anesthetic:  1.7_cc  Lidocaine 2% w/ epi 1:100,000  1.7_cc  Lidocaine 2% w/ epi 1:100,000  1.7_cc  Articaine 4% w/epi 1:100,000  1.7 Articaine 4% w/epi 1:100,000    Given via PSA/MSA/ASA nerve block, Buccal infiltration, TARAS nerve block,  Right mandible, Left mandible, and Mandibular anterior    Extractions of teeth #19 - lower left 1st molar, #23 - lower left lateral incisor, #26 - lower right lateral incisor, #31 - lower right 2nd molar.  Elevated gingiva with #9 periosteal elevator. Luxated tooth with series of straight elevators. Tooth removed completely with dental forceps. Digital compression of cortical bone performed. Surgicel placed in the extraction site. 3-0 chromic gut suture placed. Gauze hemostasis achieved by way of pressure.    Sodium Fluoride Varnish 5% placed on remaining teeth.     Throat pack placed at: 0900  Throat pack removed at: 11:00  The oropharynx  was inspected and found to be clear.   Estimated blood loss: 10 (mL)      Dental procedure Finish:  After the dental procedure, the patient was transferred to recovery room in good condition under the lead of the CRNA.The patient s family was informed by the dental team about the dental findings and procedures performed. Verbal and written post-op instructions given. All questions and concerns were invited and answered, patient and patient's family left pleased with treatment.       Clinic contact information:   [facility]  [facility]  [address] [place] [id number]  Phone:[phone]    Pre - Procedure   Patient name: Jenn Briggs  : 1980  Medical record number:  [id number]  Dental procedures performed on: 2025  It was deemed necessary for this patient to be seen in the hospital operating room because pt is not able to be seen in clinic due to: Developmental Delays    Pre-procedure with patient & guardian:   Consent: Risks complications including but not limited to post-operative pain, swelling, bleeding, infection, temporary/permanent paresthesia/anesthesia of CN V3, lingual nerve, failure to resolve chief complaint. Patient /'s guardian(s) agreed to procedure on phone call consent after all questions were invited and answered.    Providers     Dental attending:Nancy Mota DDS, N Dental Faculty  Dental resident Jade Clayton MD, DMD, Fellow  Anesthesiologist: Swapnil  CRNA: Miriam  Anesthesiology resident:  Celestino RN: Tommie Ibrahim RN: Leslie      Patient review   Patient Health history: History is obtained from the patient's parent(s)  The 10 point Review of Systems is negative other than noted in the HPI and pertinent information mentioned above.     History of Present Illness:  Jenn Briggs is a 44 year old female who presents to the OR for comprehensive dental treatment.     ASA 3 -  Patient with moderate systemic disease with functional limitations    Physical Exam:  Vitals were reviewed  Temp: 97.3  F (36.3  C) Temp src: Temporal BP: 126/85 Pulse: (!) 131   Resp: 18 SpO2: 92 % O2 Device: None (Room air) Oxygen Delivery: 8 LPM    Medical, Surgical, Social History       Past Medical History:   Diagnosis Date    Constipation     Dysphagia     Hyperlipemia     Mental retardation     Obsessive compulsive disorder     Restless leg syndrome     Seizure (H)          Past Surgical History:   Procedure Laterality Date    EXAM UNDER ANESTHESIA PELVIC  3/28/2013    Procedure: EXAM UNDER ANESTHESIA PELVIC;  Pelvic Exam, Dental Exam,  Periodontal Therapy, Radiograph, Fenectomy , gingivectomy, floride treatment in the mouth.;  Surgeon: Nayely Bird MD;  [facility]    EXAM UNDER ANESTHESIA, RESTORATIONS, EXTRACTION(S) DENTAL COMPLEX, COMBINED  3/28/2013    Procedure: COMBINED EXAM UNDER ANESTHESIA, RESTORATIONS, EXTRACTION(S) DENTAL COMPLEX;;  Surgeon: Roman Valera DDS;  [facility]    EXAM UNDER ANESTHESIA, RESTORATIONS, EXTRACTION(S) DENTAL COMPLEX, COMBINED N/A 5/20/2021    Procedure: Periodontal Therapy, Fluoride, Varnish in Mouth, Bilateral Dental Exam, Radiographs;  Surgeon: Nate Rajan DDS;  [facility]    EXAM UNDER ANESTHESIA, RESTORATIONS, EXTRACTION(S) DENTAL COMPLEX, COMBINED N/A 1/26/2023    Procedure: Bilateral dental exam, Dental radiographs, periodontal therapy, fluoride, varnish in the mouth, dental extractions x4;  Surgeon: Dom Driscoll DDS;  [facility]    EXAM UNDER ANESTHESIA, RESTORATIONS, EXTRACTION(S) DENTAL, COMBINED N/A 10/29/2015    Procedure: COMBINED EXAM UNDER ANESTHESIA, RESTORATIONS, EXTRACTION(S) DENTAL;  Surgeon: Staci Aguillon DDS;  [facility]    EXAM UNDER ANESTHESIA, RESTORATIONS, EXTRACTION(S) DENTAL, COMBINED N/A 9/11/2017    Procedure: COMBINED EXAM UNDER ANESTHESIA, RESTORATIONS, EXTRACTION(S) DENTAL;   Dental Exam Radiographs, Periodontal Therapy, Floride Varnish, deep cleaning;  Surgeon: Ivan Adair DDS;  [facility]    EXAM UNDER ANESTHESIA, RESTORATIONS, EXTRACTION(S) DENTAL, COMBINED Bilateral 3/28/2019    Procedure: Bilateral Dental Exam, Radiographs, Periodontal Therapy, Fluoride Varnish;  Surgeon: Staci Aguillon DDS;  [facility]         Social History     Tobacco Use    Smoking status: Never    Smokeless tobacco: Never   Substance Use Topics    Alcohol use: No         History reviewed. No pertinent family history.      Immunizations and Allergies     Immunization History   Administered Date(s) Administered    COVID-19 12+ (MODERNA) 05/15/2024    COVID-19 Bivalent 12+ (Pfizer) 10/25/2022    COVID-19 Monovalent 18+ (Moderna) 01/29/2021, 02/26/2021, 12/01/2021         Allergies   Allergen Reactions    Pcn [Penicillins]      unknown         Medication     Prior to Admission Medications       No current facility-administered medications for this encounter.     Current Outpatient Medications   Medication Sig Dispense Refill    bisacodyl (DULCOLAX) 10 MG suppository Place 10 mg rectally daily as needed.      calcium carbonate (OS- MG [place]. CA) 500 MG tablet Take 500 mg by mouth 2 times daily.      carbamide peroxide (DEBROX) 6.5 % otic solution 5 drops daily as needed.      cholecalciferol (VITAMIN D) 400 UNIT TABS Take 400 Units by mouth daily.      escitalopram (LEXAPRO) 20 MG tablet Take 20 mg by mouth daily.      ibuprofen (ADVIL,MOTRIN) 200 MG tablet Take 200 mg by mouth every 6 hours as needed.      lactulose (CHRONULAC) 10 GM/15ML solution Take 20 g by mouth. 15cc daily      LevETIRAcetam (KEPPRA PO) Take 500 mg by mouth 2 times daily      lisinopril (ZESTRIL) 10 MG tablet Take 10 mg by mouth daily      loratadine (CLARITIN) 10 MG tablet Take 10 mg by mouth daily.      melatonin 5 MG tablet Take 5 mg by mouth nightly as needed for sleep      norgestrel-ethinyl estradiol  (LO/OVRAL) 0.3-30 MG-MCG per tablet Take 1 tablet by mouth.      polyethylene glycol (MIRALAX/GLYCOLAX) powder Take 17 g by mouth 2 times daily.      Saline (SODIUM CHLORIDE NA) Spray  in nostril as needed.           Exam and Assessment    Jenn Briggs is a 44 year old female that presented to the OR at [facility] due to Pre-procedure diagnosis: Chronic periodontitis, unspecified K05.30 and Dental caries, unspecified, K02.9     Extra-Oral: No submandibular lymphadenopathy, no induration or erythema, no abnormal skin lesions, inferior border of mandible is palpable bilaterally, OCHOA >45mm. No physical impediment from TMJ bilaterally. No clicking of TMJ on opening and lateral movements.    Intraoral exam: Reveals heavy plaque, heavy calculus, heavy bleeding on probing, class III mobility seen on #23-lower left lateral incisor, #26-lower right lateral incisor, and #31-lower right 2nd molar. Fracture noted lingually down to bone level and furcation on #19-lower left 1st molar.     Probing depth range (mm):  Upper right: 4-5  Upper left: 4-5  Lower left: 6-7  Lower right: 4- 6    Radiographic exam: Radiographs revealed advanced bone loss in teeth #23-lower left lateral incisor, #26-lower right lateral incisor, #31-lower right 2nd molar. Radiographic fracture line seen in tooth #19-lower left 1st molar.     The post-operative diagnosis was Generalized moderate periodontitis with localized areas of advanced periodontitis.

## 2025-01-29 LAB
BKR LAB AP GYN ADEQUACY: NORMAL
BKR LAB AP GYN INTERPRETATION: NORMAL
PATH REPORT.COMMENTS IMP SPEC: NORMAL
PATH REPORT.COMMENTS IMP SPEC: NORMAL

## 2025-02-17 NOTE — Clinical Note
Pt called returning RN's call.   Trung Moreno  Not sure if it is possible - but do you think we can get the images from the 2008 Xray Video Swallow Exam   an esophagram and if not at least the report results?  Thank you,  Abbey

## (undated) DEVICE — SUCTION CANISTER MEDIVAC LINER 1500ML W/LID 65651-515

## (undated) DEVICE — LINEN GOWN X4 5410

## (undated) DEVICE — LINEN ORTHO PACK 5446

## (undated) DEVICE — TOOTHBRUSH ADULT NON STERILE MDS136850

## (undated) DEVICE — ANTIFOG SOLUTION W/FOAM PAD 31142527

## (undated) DEVICE — COVER PROBE ULTRASOUND 3D W/GEL 5X96" LF 20-P3D596

## (undated) DEVICE — TUBING SUCTION MEDI-VAC 1/4"X20' N620A

## (undated) DEVICE — PACK SET-UP STD 9102

## (undated) DEVICE — BRUSH SURGICAL SCRUB PLAIN STERILE 4454A

## (undated) DEVICE — POSITIONER ARMBOARD FOAM 1PAIR LF FP-ARMB1

## (undated) DEVICE — SYR 10ML FINGER CONTROL W/O NDL 309695

## (undated) DEVICE — BASIN SET MAJOR

## (undated) DEVICE — SOL HYDROGEN PEROXIDE 3% 4OZ BOTTLE F0010

## (undated) DEVICE — SUCTION TIP YANKAUER W/O VENT K86

## (undated) DEVICE — RX BACITRACIN OINTMENT 0.9G 1/32OZ CUR001109

## (undated) DEVICE — SPONGE RAY-TEC 4X8" 7318

## (undated) DEVICE — SOL NACL 0.9% IRRIG 1000ML BOTTLE 2F7124

## (undated) DEVICE — Device

## (undated) DEVICE — PREP POVIDONE IODINE SWABSTICKS TRIPLE PACK MDS093902A

## (undated) DEVICE — SU CHROMIC 4-0 J-1 18" 724G

## (undated) DEVICE — LABEL MEDICATION SYSTEM 3303-P

## (undated) DEVICE — LINEN GOWN OVERSIZE 5408

## (undated) DEVICE — SOL WATER IRRIG 1000ML BOTTLE 2F7114

## (undated) DEVICE — SYR EAR BULB 3OZ 0035830

## (undated) DEVICE — LIGHT HANDLE X2

## (undated) DEVICE — SPONGE SURGIFOAM 12 1972

## (undated) DEVICE — GOWN IMPERVIOUS SPECIALTY XLG/XLONG 32474

## (undated) DEVICE — SYR EAR 3OZ BULB IRR STRL DISP BLU PVC 4173

## (undated) DEVICE — RX BACITRACIN OINTMENT 0.9G 1/32OZ 01680 11109

## (undated) DEVICE — PREP POVIDONE-IODINE 10% SOLUTION 4OZ BOTTLE MDS093944

## (undated) DEVICE — GLOVE PROTEXIS W/NEU-THERA 6.5  2D73TE65

## (undated) DEVICE — STRAP KNEE/BODY 31143004

## (undated) DEVICE — GLOVE PROTEXIS W/NEU-THERA 8.5  2D73TE85

## (undated) DEVICE — PREP POVIDONE IODINE SOLUTION 10% 4OZ BOTTLE 29906-004

## (undated) DEVICE — GLOVE BIOGEL PI MICRO SZ 7.0 48570

## (undated) DEVICE — SOLIDIFIER (USE FOR UP TO 1500CC) MSOLID1500

## (undated) DEVICE — SPONGE PACK VAGINAL 2"X9

## (undated) DEVICE — PREP POVIDONE IODINE SOLUTION 10% 120ML

## (undated) DEVICE — OINTMENT ANTIBIOTIC BACITRACIN ZINC .9 G 1171

## (undated) DEVICE — NDL 27GA 1.25" 305136

## (undated) DEVICE — PAD CHUX UNDERPAD 30X36" P3036C

## (undated) DEVICE — PEN MARKING SKIN W/LABELS 31145918

## (undated) DEVICE — PREP POVIDONE IODINE 10% SWABSTICKS TRIPLE PACK AS-PVPSBPT

## (undated) DEVICE — APPLICATOR COTTON TIP 6"X2 STERILE LF 6012

## (undated) DEVICE — GLOVE BIOGEL PI MICRO INDICATOR UNDERGLOVE SZ 7.5 48975

## (undated) DEVICE — NDL COUNTER 40CT  31142311

## (undated) RX ORDER — KETOROLAC TROMETHAMINE 30 MG/ML
INJECTION, SOLUTION INTRAMUSCULAR; INTRAVENOUS
Status: DISPENSED
Start: 2023-01-26

## (undated) RX ORDER — MIDAZOLAM HYDROCHLORIDE 2 MG/ML
SYRUP ORAL
Status: DISPENSED
Start: 2021-05-20

## (undated) RX ORDER — FENTANYL CITRATE 50 UG/ML
INJECTION, SOLUTION INTRAMUSCULAR; INTRAVENOUS
Status: DISPENSED
Start: 2021-05-20

## (undated) RX ORDER — DEXAMETHASONE SODIUM PHOSPHATE 4 MG/ML
INJECTION, SOLUTION INTRA-ARTICULAR; INTRALESIONAL; INTRAMUSCULAR; INTRAVENOUS; SOFT TISSUE
Status: DISPENSED
Start: 2025-01-22

## (undated) RX ORDER — HYDROMORPHONE HYDROCHLORIDE 1 MG/ML
INJECTION, SOLUTION INTRAMUSCULAR; INTRAVENOUS; SUBCUTANEOUS
Status: DISPENSED
Start: 2025-01-22

## (undated) RX ORDER — PROPOFOL 10 MG/ML
INJECTION, EMULSION INTRAVENOUS
Status: DISPENSED
Start: 2017-09-11

## (undated) RX ORDER — PROPOFOL 10 MG/ML
INJECTION, EMULSION INTRAVENOUS
Status: DISPENSED
Start: 2019-03-28

## (undated) RX ORDER — GLYCOPYRROLATE 0.2 MG/ML
INJECTION INTRAMUSCULAR; INTRAVENOUS
Status: DISPENSED
Start: 2023-01-26

## (undated) RX ORDER — EPHEDRINE SULFATE 50 MG/ML
INJECTION, SOLUTION INTRAMUSCULAR; INTRAVENOUS; SUBCUTANEOUS
Status: DISPENSED
Start: 2023-01-26

## (undated) RX ORDER — EPHEDRINE SULFATE 50 MG/ML
INJECTION, SOLUTION INTRAMUSCULAR; INTRAVENOUS; SUBCUTANEOUS
Status: DISPENSED
Start: 2021-05-20

## (undated) RX ORDER — MIDAZOLAM HYDROCHLORIDE 2 MG/ML
SYRUP ORAL
Status: DISPENSED
Start: 2023-01-26

## (undated) RX ORDER — CHLORHEXIDINE GLUCONATE ORAL RINSE 1.2 MG/ML
SOLUTION DENTAL
Status: DISPENSED
Start: 2021-05-20

## (undated) RX ORDER — FENTANYL CITRATE-0.9 % NACL/PF 10 MCG/ML
PLASTIC BAG, INJECTION (ML) INTRAVENOUS
Status: DISPENSED
Start: 2023-01-26

## (undated) RX ORDER — OXYMETAZOLINE HYDROCHLORIDE 0.05 G/100ML
SPRAY NASAL
Status: DISPENSED
Start: 2025-01-22

## (undated) RX ORDER — OXYMETAZOLINE HYDROCHLORIDE 0.05 G/100ML
SPRAY NASAL
Status: DISPENSED
Start: 2017-09-11

## (undated) RX ORDER — CLINDAMYCIN PHOSPHATE 900 MG/50ML
INJECTION, SOLUTION INTRAVENOUS
Status: DISPENSED
Start: 2017-09-11

## (undated) RX ORDER — ONDANSETRON 2 MG/ML
INJECTION INTRAMUSCULAR; INTRAVENOUS
Status: DISPENSED
Start: 2019-03-28

## (undated) RX ORDER — CHLORHEXIDINE GLUCONATE ORAL RINSE 1.2 MG/ML
SOLUTION DENTAL
Status: DISPENSED
Start: 2025-01-22

## (undated) RX ORDER — PROPOFOL 10 MG/ML
INJECTION, EMULSION INTRAVENOUS
Status: DISPENSED
Start: 2025-01-22

## (undated) RX ORDER — CLINDAMYCIN PHOSPHATE 900 MG/50ML
INJECTION, SOLUTION INTRAVENOUS
Status: DISPENSED
Start: 2019-03-28

## (undated) RX ORDER — KETOROLAC TROMETHAMINE 30 MG/ML
INJECTION, SOLUTION INTRAMUSCULAR; INTRAVENOUS
Status: DISPENSED
Start: 2025-01-22

## (undated) RX ORDER — LIDOCAINE HYDROCHLORIDE 20 MG/ML
INJECTION, SOLUTION EPIDURAL; INFILTRATION; INTRACAUDAL; PERINEURAL
Status: DISPENSED
Start: 2019-03-28

## (undated) RX ORDER — ACETAMINOPHEN 500 MG
TABLET ORAL
Status: DISPENSED
Start: 2017-09-11

## (undated) RX ORDER — MIDAZOLAM HYDROCHLORIDE 2 MG/ML
SYRUP ORAL
Status: DISPENSED
Start: 2019-03-28

## (undated) RX ORDER — PROPOFOL 10 MG/ML
INJECTION, EMULSION INTRAVENOUS
Status: DISPENSED
Start: 2023-01-26

## (undated) RX ORDER — FENTANYL CITRATE 50 UG/ML
INJECTION, SOLUTION INTRAMUSCULAR; INTRAVENOUS
Status: DISPENSED
Start: 2023-01-26

## (undated) RX ORDER — LIDOCAINE HYDROCHLORIDE 20 MG/ML
INJECTION, SOLUTION EPIDURAL; INFILTRATION; INTRACAUDAL; PERINEURAL
Status: DISPENSED
Start: 2021-05-20

## (undated) RX ORDER — KETOROLAC TROMETHAMINE 30 MG/ML
INJECTION, SOLUTION INTRAMUSCULAR; INTRAVENOUS
Status: DISPENSED
Start: 2019-03-28

## (undated) RX ORDER — EPHEDRINE SULFATE 50 MG/ML
INJECTION, SOLUTION INTRAMUSCULAR; INTRAVENOUS; SUBCUTANEOUS
Status: DISPENSED
Start: 2025-01-22

## (undated) RX ORDER — FENTANYL CITRATE 50 UG/ML
INJECTION, SOLUTION INTRAMUSCULAR; INTRAVENOUS
Status: DISPENSED
Start: 2017-09-11

## (undated) RX ORDER — CHLORHEXIDINE GLUCONATE ORAL RINSE 1.2 MG/ML
SOLUTION DENTAL
Status: DISPENSED
Start: 2019-03-28

## (undated) RX ORDER — FENTANYL CITRATE-0.9 % NACL/PF 10 MCG/ML
PLASTIC BAG, INJECTION (ML) INTRAVENOUS
Status: DISPENSED
Start: 2025-01-22

## (undated) RX ORDER — ONDANSETRON 2 MG/ML
INJECTION INTRAMUSCULAR; INTRAVENOUS
Status: DISPENSED
Start: 2025-01-22

## (undated) RX ORDER — ONDANSETRON 2 MG/ML
INJECTION INTRAMUSCULAR; INTRAVENOUS
Status: DISPENSED
Start: 2021-05-20

## (undated) RX ORDER — DEXAMETHASONE SODIUM PHOSPHATE 4 MG/ML
INJECTION, SOLUTION INTRA-ARTICULAR; INTRALESIONAL; INTRAMUSCULAR; INTRAVENOUS; SOFT TISSUE
Status: DISPENSED
Start: 2021-05-20

## (undated) RX ORDER — ONDANSETRON 2 MG/ML
INJECTION INTRAMUSCULAR; INTRAVENOUS
Status: DISPENSED
Start: 2017-09-11

## (undated) RX ORDER — DEXAMETHASONE SODIUM PHOSPHATE 4 MG/ML
INJECTION, SOLUTION INTRA-ARTICULAR; INTRALESIONAL; INTRAMUSCULAR; INTRAVENOUS; SOFT TISSUE
Status: DISPENSED
Start: 2017-09-11

## (undated) RX ORDER — OXYMETAZOLINE HYDROCHLORIDE 0.05 G/100ML
SPRAY NASAL
Status: DISPENSED
Start: 2019-03-28

## (undated) RX ORDER — DEXAMETHASONE SODIUM PHOSPHATE 4 MG/ML
INJECTION, SOLUTION INTRA-ARTICULAR; INTRALESIONAL; INTRAMUSCULAR; INTRAVENOUS; SOFT TISSUE
Status: DISPENSED
Start: 2023-01-26

## (undated) RX ORDER — CHLORHEXIDINE GLUCONATE ORAL RINSE 1.2 MG/ML
SOLUTION DENTAL
Status: DISPENSED
Start: 2023-01-26

## (undated) RX ORDER — PROPOFOL 10 MG/ML
INJECTION, EMULSION INTRAVENOUS
Status: DISPENSED
Start: 2021-05-20

## (undated) RX ORDER — FENTANYL CITRATE 50 UG/ML
INJECTION, SOLUTION INTRAMUSCULAR; INTRAVENOUS
Status: DISPENSED
Start: 2019-03-28

## (undated) RX ORDER — ONDANSETRON 2 MG/ML
INJECTION INTRAMUSCULAR; INTRAVENOUS
Status: DISPENSED
Start: 2023-01-26

## (undated) RX ORDER — LIDOCAINE HYDROCHLORIDE 20 MG/ML
INJECTION, SOLUTION EPIDURAL; INFILTRATION; INTRACAUDAL; PERINEURAL
Status: DISPENSED
Start: 2017-09-11

## (undated) RX ORDER — ACETAMINOPHEN 325 MG/1
TABLET ORAL
Status: DISPENSED
Start: 2023-01-26

## (undated) RX ORDER — MIDAZOLAM HYDROCHLORIDE 2 MG/ML
SYRUP ORAL
Status: DISPENSED
Start: 2017-09-11

## (undated) RX ORDER — FENTANYL CITRATE 50 UG/ML
INJECTION, SOLUTION INTRAMUSCULAR; INTRAVENOUS
Status: DISPENSED
Start: 2025-01-22